# Patient Record
Sex: FEMALE | Race: WHITE | NOT HISPANIC OR LATINO | Employment: OTHER | URBAN - METROPOLITAN AREA
[De-identification: names, ages, dates, MRNs, and addresses within clinical notes are randomized per-mention and may not be internally consistent; named-entity substitution may affect disease eponyms.]

---

## 2017-02-03 ENCOUNTER — OFFICE VISIT - HEALTHEAST (OUTPATIENT)
Dept: INTERNAL MEDICINE | Facility: CLINIC | Age: 72
End: 2017-02-03

## 2017-02-03 DIAGNOSIS — J06.9 URI (UPPER RESPIRATORY INFECTION): ICD-10-CM

## 2017-02-03 ASSESSMENT — MIFFLIN-ST. JEOR: SCORE: 1193.95

## 2017-02-08 ENCOUNTER — COMMUNICATION - HEALTHEAST (OUTPATIENT)
Dept: INTERNAL MEDICINE | Facility: CLINIC | Age: 72
End: 2017-02-08

## 2017-03-01 ENCOUNTER — COMMUNICATION - HEALTHEAST (OUTPATIENT)
Dept: INTERNAL MEDICINE | Facility: CLINIC | Age: 72
End: 2017-03-01

## 2017-04-07 ENCOUNTER — COMMUNICATION - HEALTHEAST (OUTPATIENT)
Dept: INTERNAL MEDICINE | Facility: CLINIC | Age: 72
End: 2017-04-07

## 2017-04-10 ENCOUNTER — COMMUNICATION - HEALTHEAST (OUTPATIENT)
Dept: INTERNAL MEDICINE | Facility: CLINIC | Age: 72
End: 2017-04-10

## 2017-04-14 ENCOUNTER — OFFICE VISIT - HEALTHEAST (OUTPATIENT)
Dept: INTERNAL MEDICINE | Facility: CLINIC | Age: 72
End: 2017-04-14

## 2017-04-14 DIAGNOSIS — R05.9 COUGH: ICD-10-CM

## 2017-04-14 DIAGNOSIS — J30.9 ALLERGIC RHINITIS: ICD-10-CM

## 2017-04-14 ASSESSMENT — MIFFLIN-ST. JEOR: SCORE: 1176.35

## 2017-05-07 ENCOUNTER — COMMUNICATION - HEALTHEAST (OUTPATIENT)
Dept: INTERNAL MEDICINE | Facility: CLINIC | Age: 72
End: 2017-05-07

## 2017-06-27 ENCOUNTER — COMMUNICATION - HEALTHEAST (OUTPATIENT)
Dept: INTERNAL MEDICINE | Facility: CLINIC | Age: 72
End: 2017-06-27

## 2017-08-03 ENCOUNTER — COMMUNICATION - HEALTHEAST (OUTPATIENT)
Dept: INTERNAL MEDICINE | Facility: CLINIC | Age: 72
End: 2017-08-03

## 2017-08-07 ENCOUNTER — OFFICE VISIT - HEALTHEAST (OUTPATIENT)
Dept: INTERNAL MEDICINE | Facility: CLINIC | Age: 72
End: 2017-08-07

## 2017-08-07 DIAGNOSIS — F41.8 SITUATIONAL ANXIETY: ICD-10-CM

## 2017-08-07 DIAGNOSIS — Z00.00 WELLNESS EXAMINATION: ICD-10-CM

## 2017-08-07 LAB
CHOLEST SERPL-MCNC: 253 MG/DL
FASTING STATUS PATIENT QL REPORTED: YES
HDLC SERPL-MCNC: 53 MG/DL
LDLC SERPL CALC-MCNC: 183 MG/DL
TRIGL SERPL-MCNC: 83 MG/DL

## 2017-08-07 ASSESSMENT — MIFFLIN-ST. JEOR: SCORE: 1184.88

## 2017-08-16 ENCOUNTER — AMBULATORY - HEALTHEAST (OUTPATIENT)
Dept: INTERNAL MEDICINE | Facility: CLINIC | Age: 72
End: 2017-08-16

## 2017-08-16 ENCOUNTER — COMMUNICATION - HEALTHEAST (OUTPATIENT)
Dept: INTERNAL MEDICINE | Facility: CLINIC | Age: 72
End: 2017-08-16

## 2017-08-17 ENCOUNTER — AMBULATORY - HEALTHEAST (OUTPATIENT)
Dept: INTERNAL MEDICINE | Facility: CLINIC | Age: 72
End: 2017-08-17

## 2017-08-24 ENCOUNTER — COMMUNICATION - HEALTHEAST (OUTPATIENT)
Dept: NEUROLOGY | Facility: CLINIC | Age: 72
End: 2017-08-24

## 2017-08-29 ENCOUNTER — HOSPITAL ENCOUNTER (OUTPATIENT)
Dept: NEUROLOGY | Facility: CLINIC | Age: 72
Setting detail: THERAPIES SERIES
Discharge: STILL ADMITTED OR ADMINISTRATIVE | End: 2017-08-29
Attending: PSYCHOLOGIST

## 2017-08-29 DIAGNOSIS — F43.23 ADJUSTMENT DISORDER WITH MIXED ANXIETY AND DEPRESSED MOOD: ICD-10-CM

## 2017-09-25 ENCOUNTER — COMMUNICATION - HEALTHEAST (OUTPATIENT)
Dept: HEALTH INFORMATION MANAGEMENT | Facility: CLINIC | Age: 72
End: 2017-09-25

## 2017-10-06 ENCOUNTER — RECORDS - HEALTHEAST (OUTPATIENT)
Dept: ADMINISTRATIVE | Facility: OTHER | Age: 72
End: 2017-10-06

## 2017-10-09 ENCOUNTER — RECORDS - HEALTHEAST (OUTPATIENT)
Dept: ADMINISTRATIVE | Facility: OTHER | Age: 72
End: 2017-10-09

## 2017-10-09 ENCOUNTER — RECORDS - HEALTHEAST (OUTPATIENT)
Dept: BONE DENSITY | Facility: CLINIC | Age: 72
End: 2017-10-09

## 2017-10-09 DIAGNOSIS — Z00.00 ENCOUNTER FOR GENERAL ADULT MEDICAL EXAMINATION WITHOUT ABNORMAL FINDINGS: ICD-10-CM

## 2017-10-18 ENCOUNTER — OFFICE VISIT - HEALTHEAST (OUTPATIENT)
Dept: INTERNAL MEDICINE | Facility: CLINIC | Age: 72
End: 2017-10-18

## 2017-10-18 DIAGNOSIS — R93.1 AGATSTON CORONARY ARTERY CALCIUM SCORE LESS THAN 100: ICD-10-CM

## 2017-10-18 DIAGNOSIS — E78.5 HYPERLIPIDEMIA: ICD-10-CM

## 2017-10-18 DIAGNOSIS — M85.80 OSTEOPENIA: ICD-10-CM

## 2017-10-18 LAB
CHOLEST SERPL-MCNC: 197 MG/DL
FASTING STATUS PATIENT QL REPORTED: YES
HDLC SERPL-MCNC: 63 MG/DL
LDLC SERPL CALC-MCNC: 121 MG/DL
TRIGL SERPL-MCNC: 67 MG/DL

## 2017-10-18 ASSESSMENT — MIFFLIN-ST. JEOR: SCORE: 1194.13

## 2017-11-15 ENCOUNTER — COMMUNICATION - HEALTHEAST (OUTPATIENT)
Dept: INTERNAL MEDICINE | Facility: CLINIC | Age: 72
End: 2017-11-15

## 2017-11-25 ENCOUNTER — COMMUNICATION - HEALTHEAST (OUTPATIENT)
Dept: INTERNAL MEDICINE | Facility: CLINIC | Age: 72
End: 2017-11-25

## 2018-06-01 ENCOUNTER — HOSPITAL ENCOUNTER (OUTPATIENT)
Dept: NEUROLOGY | Facility: CLINIC | Age: 73
Setting detail: THERAPIES SERIES
Discharge: STILL ADMITTED OR ADMINISTRATIVE | End: 2018-06-01
Attending: PSYCHOLOGIST

## 2018-06-01 DIAGNOSIS — F43.23 ADJUSTMENT DISORDER WITH MIXED ANXIETY AND DEPRESSED MOOD: ICD-10-CM

## 2018-06-29 ENCOUNTER — COMMUNICATION - HEALTHEAST (OUTPATIENT)
Dept: INTERNAL MEDICINE | Facility: CLINIC | Age: 73
End: 2018-06-29

## 2018-06-29 DIAGNOSIS — F41.8 SITUATIONAL ANXIETY: ICD-10-CM

## 2018-07-02 ENCOUNTER — HOSPITAL ENCOUNTER (OUTPATIENT)
Dept: NEUROLOGY | Facility: CLINIC | Age: 73
Setting detail: THERAPIES SERIES
Discharge: STILL ADMITTED OR ADMINISTRATIVE | End: 2018-07-02
Attending: PSYCHOLOGIST

## 2018-07-02 DIAGNOSIS — F43.22 ADJUSTMENT DISORDER WITH ANXIETY: ICD-10-CM

## 2018-08-09 ENCOUNTER — COMMUNICATION - HEALTHEAST (OUTPATIENT)
Dept: INTERNAL MEDICINE | Facility: CLINIC | Age: 73
End: 2018-08-09

## 2018-08-09 DIAGNOSIS — E78.5 HYPERLIPIDEMIA LDL GOAL <100: ICD-10-CM

## 2018-10-17 ENCOUNTER — RECORDS - HEALTHEAST (OUTPATIENT)
Dept: ADMINISTRATIVE | Facility: OTHER | Age: 73
End: 2018-10-17

## 2018-10-17 ENCOUNTER — TRANSFERRED RECORDS (OUTPATIENT)
Dept: MULTI SPECIALTY CLINIC | Facility: CLINIC | Age: 73
End: 2018-10-17

## 2018-11-20 ENCOUNTER — COMMUNICATION - HEALTHEAST (OUTPATIENT)
Dept: INTERNAL MEDICINE | Facility: CLINIC | Age: 73
End: 2018-11-20

## 2018-11-20 DIAGNOSIS — E78.5 HYPERLIPIDEMIA LDL GOAL <100: ICD-10-CM

## 2019-02-04 ENCOUNTER — COMMUNICATION - HEALTHEAST (OUTPATIENT)
Dept: INTERNAL MEDICINE | Facility: CLINIC | Age: 74
End: 2019-02-04

## 2019-02-09 ENCOUNTER — COMMUNICATION - HEALTHEAST (OUTPATIENT)
Dept: INTERNAL MEDICINE | Facility: CLINIC | Age: 74
End: 2019-02-09

## 2019-02-09 DIAGNOSIS — E78.5 HYPERLIPIDEMIA LDL GOAL <100: ICD-10-CM

## 2019-05-11 ENCOUNTER — COMMUNICATION - HEALTHEAST (OUTPATIENT)
Dept: INTERNAL MEDICINE | Facility: CLINIC | Age: 74
End: 2019-05-11

## 2019-05-11 DIAGNOSIS — E78.5 HYPERLIPIDEMIA LDL GOAL <100: ICD-10-CM

## 2019-08-27 ENCOUNTER — TELEPHONE (OUTPATIENT)
Dept: FAMILY MEDICINE | Facility: CLINIC | Age: 74
End: 2019-08-27

## 2019-08-27 ENCOUNTER — OFFICE VISIT (OUTPATIENT)
Dept: FAMILY MEDICINE | Facility: CLINIC | Age: 74
End: 2019-08-27
Payer: COMMERCIAL

## 2019-08-27 VITALS
HEART RATE: 68 BPM | OXYGEN SATURATION: 99 % | WEIGHT: 138.4 LBS | BODY MASS INDEX: 20.5 KG/M2 | TEMPERATURE: 97.6 F | DIASTOLIC BLOOD PRESSURE: 63 MMHG | HEIGHT: 69 IN | SYSTOLIC BLOOD PRESSURE: 103 MMHG

## 2019-08-27 DIAGNOSIS — R10.11 ABDOMINAL PAIN, RIGHT UPPER QUADRANT: Primary | ICD-10-CM

## 2019-08-27 DIAGNOSIS — R10.11 ABDOMINAL PAIN, RIGHT UPPER QUADRANT: ICD-10-CM

## 2019-08-27 DIAGNOSIS — R94.4 DECREASED CALCULATED GFR: Primary | ICD-10-CM

## 2019-08-27 LAB
ALBUMIN SERPL-MCNC: 3.5 G/DL (ref 3.4–5)
ALP SERPL-CCNC: 42 U/L (ref 40–150)
ALT SERPL W P-5'-P-CCNC: 20 U/L (ref 0–50)
ANION GAP SERPL CALCULATED.3IONS-SCNC: 3 MMOL/L (ref 3–14)
AST SERPL W P-5'-P-CCNC: 14 U/L (ref 0–45)
BASOPHILS # BLD AUTO: 0 10E9/L (ref 0–0.2)
BASOPHILS NFR BLD AUTO: 0.5 %
BILIRUB SERPL-MCNC: 0.6 MG/DL (ref 0.2–1.3)
BUN SERPL-MCNC: 17 MG/DL (ref 7–30)
CALCIUM SERPL-MCNC: 9.4 MG/DL (ref 8.5–10.1)
CHLORIDE SERPL-SCNC: 106 MMOL/L (ref 94–109)
CO2 SERPL-SCNC: 31 MMOL/L (ref 20–32)
CREAT SERPL-MCNC: 0.96 MG/DL (ref 0.52–1.04)
DIFFERENTIAL METHOD BLD: NORMAL
EOSINOPHIL # BLD AUTO: 0.1 10E9/L (ref 0–0.7)
EOSINOPHIL NFR BLD AUTO: 2.7 %
ERYTHROCYTE [DISTWIDTH] IN BLOOD BY AUTOMATED COUNT: 12.2 % (ref 10–15)
GFR SERPL CREATININE-BSD FRML MDRD: 58 ML/MIN/{1.73_M2}
GLUCOSE SERPL-MCNC: 71 MG/DL (ref 70–99)
HCT VFR BLD AUTO: 44.5 % (ref 35–47)
HGB BLD-MCNC: 14.6 G/DL (ref 11.7–15.7)
IMM GRANULOCYTES # BLD: 0 10E9/L (ref 0–0.4)
IMM GRANULOCYTES NFR BLD: 0.2 %
LYMPHOCYTES # BLD AUTO: 1.1 10E9/L (ref 0.8–5.3)
LYMPHOCYTES NFR BLD AUTO: 26.6 %
MCH RBC QN AUTO: 30.9 PG (ref 26.5–33)
MCHC RBC AUTO-ENTMCNC: 32.8 G/DL (ref 31.5–36.5)
MCV RBC AUTO: 94 FL (ref 78–100)
MONOCYTES # BLD AUTO: 0.4 10E9/L (ref 0–1.3)
MONOCYTES NFR BLD AUTO: 9.2 %
NEUTROPHILS # BLD AUTO: 2.4 10E9/L (ref 1.6–8.3)
NEUTROPHILS NFR BLD AUTO: 60.8 %
NRBC # BLD AUTO: 0 10*3/UL
NRBC BLD AUTO-RTO: 0 /100
PLATELET # BLD AUTO: 222 10E9/L (ref 150–450)
POTASSIUM SERPL-SCNC: 4.2 MMOL/L (ref 3.4–5.3)
PROT SERPL-MCNC: 6.7 G/DL (ref 6.8–8.8)
RBC # BLD AUTO: 4.73 10E12/L (ref 3.8–5.2)
SODIUM SERPL-SCNC: 140 MMOL/L (ref 133–144)
WBC # BLD AUTO: 4 10E9/L (ref 4–11)

## 2019-08-27 ASSESSMENT — MIFFLIN-ST. JEOR: SCORE: 1187.16

## 2019-08-27 ASSESSMENT — PAIN SCALES - GENERAL: PAINLEVEL: NO PAIN (1)

## 2019-08-27 NOTE — TELEPHONE ENCOUNTER
Reviewed lab work with patient on the phone.  Patient's eGFR is slightly reduced, will have patient recheck in the next 2 to 4 weeks.  CHUNGW

## 2019-08-27 NOTE — PATIENT INSTRUCTIONS

## 2019-08-27 NOTE — NURSING NOTE
"74 year old  Chief Complaint   Patient presents with     Pain     Pt has pain under R ribs x2 weeks        Blood pressure 103/63, pulse 68, temperature 97.6  F (36.4  C), height 1.753 m (5' 9\"), weight 62.8 kg (138 lb 6.4 oz), SpO2 99 %. Body mass index is 20.44 kg/m .  BP completed using cuff size:      Destiny Leonard MA  August 27, 2019 8:46 AM  "

## 2019-08-27 NOTE — PROGRESS NOTES
Subjective     Zelda Osman is a 74 year old female who presents to clinic today for the following health issues:  HPI   Patient presents with a 2 weeks history of intermittent RUQ pain.  She describes the pain as a dull ache, rates it 1/10 up to 4/10.  When it is 4/10 it sometimes feels more sharp and it can burn.  She states it can last a few minutes up to 1 hour.  She wonders if it stress related as it started when she brought her  to the ED for diarrhea.  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 Sometimes she notices the pain if she lays more on her side.  She states she does work out at the gym and was recently clearing some brush.  She saw her  who thought it maybe an upper right abdominal strain.  She does feel like exercise maybe makes it better or that she possibly is distracted during exercise and does not feel it.    Declines fever, chills, body aches, unintentional weight loss, no relationship to food, declines constipation or diarrhea.  She declines that she had diarrhea when her  had diarrhea.  Drinks approximately 1 beer per week.   No tobacco use.   No heartburn.  No chest pain or SOB.   No night sweats or intentional weight loss.     ABDOMINAL   PAIN     Onset x 2 weeks  - intermittent - few minutes.    Hour.   Exercise makes it better.      Description:   Character: Dull ache  Location: right upper quadrant  Radiation: None     Intensity: 1/10 to 4/10 (at the worse) - sometimes full sharps and burning.      Progression of Symptoms:  same    Accompanying Signs & Symptoms:  Fever/Chills?: no   Gas/Bloating: no   Nausea: no   Vomitting: no   Diarrhea?: no   Constipation:no   Dysuria or Hematuria: no    History:   Trauma: no   Previous similar pain: no    Previous tests done: none    Precipitating factors:   Does the pain change with:     Food: no      BM: no     Urination: no     Alleviating factors:  -heating pad - feels better.      Therapies Tried and  "outcome: - Tylenol and heating pad.      LMP:  not applicable    Ovarian cyst, ovarian cancer (possibly).         There is no problem list on file for this patient.    Past Surgical History:   Procedure Laterality Date     bowel obstruction due to adhesions       HYSTERECTOMY       OOPHORECTOMY Bilateral        Social History     Tobacco Use     Smoking status: Never Smoker     Smokeless tobacco: Never Used   Substance Use Topics     Alcohol use: Not on file     No family history on file.      No current outpatient medications on file.     No Known Allergies  No lab results found.   BP Readings from Last 3 Encounters:   08/27/19 103/63    Wt Readings from Last 3 Encounters:   08/27/19 62.8 kg (138 lb 6.4 oz)            Reviewed and updated as needed this visit by Provider  Med Hx         Review of Systems   CONSTITUTIONAL: NEGATIVE for fever, chills, change in weight  INTEGUMENTARU/SKIN: NEGATIVE for worrisome rashes, moles or lesions  RESP: NEGATIVE for significant cough or SOB  CV: NEGATIVE for chest pain  GI: NEGATIVE for nausea, heartburn, or change in bowel habits.  POSITIVE for RUQ abdominal pain.   : NEGATIVE for dysuria   PSYCHIATRIC: NEGATIVE for changes in mood or affect    Objective    /63   Pulse 68   Temp 97.6  F (36.4  C)   Ht 1.753 m (5' 9\")   Wt 62.8 kg (138 lb 6.4 oz)   SpO2 99%   BMI 20.44 kg/m    Body mass index is 20.44 kg/m .  Physical Exam   GENERAL: healthy, alert and no distress  RESP: lungs clear to auscultation - no rales, rhonchi or wheezes  CV: regular rate and rhythm, normal S1 S2, no S3 or S4, no murmur, click or rub, no peripheral edema and peripheral pulses strong  ABDOMEN: soft, nontender, no hepatosplenomegaly, no masses and bowel sounds normal  PSYCH: mentation appears normal, affect normal/bright    Diagnostic Test Results:  Labs reviewed in Epic    CBC with diff and CMP are pending    Assessment & Plan     Zelda was seen today for pain.    Diagnoses and all " orders for this visit:    Abdominal pain, right upper quadrant  -     CBC with platelets differential; Future  -     Comprehensive metabolic panel; Future           See Patient Instructions  Patient feels like pain is improving.  She wonders if it is a muscle pull.  Her abdominal examination was benign.  Will obtain lab work today.  Discussed abdominal ultrasound with patient.  Patient would like to hold off on any imaging.  Discussed s/s requiring urgent evaluation - worsening symptoms, fever, chills, body aches. She will try a warm compress or ice.  Return to clinic if no improvement or symptoms worsen. Patient verbalized understanding & agreed with plan of care.    JEWEL Stern, CNP  [facility]

## 2019-08-30 ENCOUNTER — TELEPHONE (OUTPATIENT)
Dept: FAMILY MEDICINE | Facility: CLINIC | Age: 74
End: 2019-08-30

## 2019-08-30 DIAGNOSIS — R10.11 RUQ ABDOMINAL PAIN: Primary | ICD-10-CM

## 2019-08-30 NOTE — TELEPHONE ENCOUNTER
[facility] Call Center    Phone Message    May a detailed message be left on voicemail: yes    Reason for Call: Other: Pt requesting call back. Pain in side has increased and would like to move forward with having Ultrasound done today if possible. Please call.       NOTE: Pt saw Mona Alex on 8/27 for this and it was discussed at that visit.     Action Taken: Message routed to:  [facility] Nurse Practioner [facility]

## 2019-08-30 NOTE — TELEPHONE ENCOUNTER
Please abstract the following data from this visit with this patient into the appropriate field in Epic:    Tests that can be patient reported without a hard copy:      Other Tests found in the patient's chart through Chart Review/Care Everywhere:    Mammogram done by this group [facility] on this date: 10/17/18    Note to Abstraction: If this section is blank, no results were found via Chart Review/Care Everywhere.

## 2019-09-03 ENCOUNTER — TELEPHONE (OUTPATIENT)
Dept: FAMILY MEDICINE | Facility: CLINIC | Age: 74
End: 2019-09-03

## 2019-09-03 ENCOUNTER — ANCILLARY PROCEDURE (OUTPATIENT)
Dept: ULTRASOUND IMAGING | Facility: CLINIC | Age: 74
End: 2019-09-03
Attending: NURSE PRACTITIONER
Payer: COMMERCIAL

## 2019-09-03 ENCOUNTER — DOCUMENTATION ONLY (OUTPATIENT)
Dept: CARE COORDINATION | Facility: CLINIC | Age: 74
End: 2019-09-03

## 2019-09-03 DIAGNOSIS — R94.4 DECREASED CALCULATED GFR: ICD-10-CM

## 2019-09-03 DIAGNOSIS — R10.11 RUQ ABDOMINAL PAIN: ICD-10-CM

## 2019-09-03 DIAGNOSIS — K82.4 GALLBLADDER POLYP: Primary | ICD-10-CM

## 2019-09-03 DIAGNOSIS — R93.5 ABNORMAL ABDOMINAL ULTRASOUND: ICD-10-CM

## 2019-09-03 LAB
ANION GAP SERPL CALCULATED.3IONS-SCNC: <1 MMOL/L (ref 3–14)
BUN SERPL-MCNC: 16 MG/DL (ref 7–30)
CALCIUM SERPL-MCNC: 9.3 MG/DL (ref 8.5–10.1)
CHLORIDE SERPL-SCNC: 107 MMOL/L (ref 94–109)
CO2 SERPL-SCNC: 31 MMOL/L (ref 20–32)
CREAT SERPL-MCNC: 0.9 MG/DL (ref 0.52–1.04)
GFR SERPL CREATININE-BSD FRML MDRD: 62 ML/MIN/{1.73_M2}
GLUCOSE SERPL-MCNC: 99 MG/DL (ref 70–99)
POTASSIUM SERPL-SCNC: 4.3 MMOL/L (ref 3.4–5.3)
SODIUM SERPL-SCNC: 139 MMOL/L (ref 133–144)

## 2019-09-03 NOTE — TELEPHONE ENCOUNTER
Spoke to patient.  Her symptoms are improved. She would like to go ahead with a MRI of her abdomen.  Referral to general surgery placed.   AISHWARYA

## 2019-09-03 NOTE — TELEPHONE ENCOUNTER
"8/30/2019 - late entry - called and spoke to patient. She is having more right upper quadrant pain, but still wonders if it is due to stress.  Declines urinary symptoms or flank pain. Declines fever, chills, or body aches.  Patient is set-up for an abdominal us on Tuesday.  If she has worsening symptoms over the weekend, she will proceed to the ED.    9/3/2019 Left patient a non-detailed message regarding results.  Last Comprehensive Metabolic Panel:  Sodium   Date Value Ref Range Status   09/03/2019 139 133 - 144 mmol/L Final     Potassium   Date Value Ref Range Status   09/03/2019 4.3 3.4 - 5.3 mmol/L Final     Chloride   Date Value Ref Range Status   09/03/2019 107 94 - 109 mmol/L Final     Carbon Dioxide   Date Value Ref Range Status   09/03/2019 31 20 - 32 mmol/L Final     Anion Gap   Date Value Ref Range Status   09/03/2019 <1 (L) 3 - 14 mmol/L Final     Glucose   Date Value Ref Range Status   09/03/2019 99 70 - 99 mg/dL Final     Urea Nitrogen   Date Value Ref Range Status   09/03/2019 16 7 - 30 mg/dL Final     Creatinine   Date Value Ref Range Status   09/03/2019 0.90 0.52 - 1.04 mg/dL Final     GFR Estimate   Date Value Ref Range Status   09/03/2019 62 >60 mL/min/[1.73_m2] Final     Comment:     Non  GFR Calc  Starting 12/18/2018, serum creatinine based estimated GFR (eGFR) will be   calculated using the Chronic Kidney Disease Epidemiology Collaboration   (CKD-EPI) equation.       Calcium   Date Value Ref Range Status   09/03/2019 9.3 8.5 - 10.1 mg/dL Final   Abdominal ultrasound showed -  \"IMPRESSION:   1. No acute right upper quadrant abnormality.  2. Echogenic 6 mm gallbladder wall nodule, likely represents adherent  stone versus polyp.  3. Echogenic 1.1 cm focus in the left kidney, not previously described  on outside report 12/20/2014. Recommend correlation with prior images  as described simply reflect a small angiomyolipoma. If no prior  imaging is available, management options would " "include 6 month  follow-up ultrasound exam versus dedicated MRI abdomen.     I have personally reviewed the examination and initial interpretation  and I agree with the findings.     JOSE GOMEZ MD\"  "

## 2019-09-04 ENCOUNTER — PRE VISIT (OUTPATIENT)
Dept: GASTROENTEROLOGY | Facility: CLINIC | Age: 74
End: 2019-09-04

## 2019-09-04 ASSESSMENT — ENCOUNTER SYMPTOMS
NAUSEA: 0
INCREASED ENERGY: 0
BLOATING: 0
HEARTBURN: 0
DECREASED APPETITE: 0
FEVER: 0
HALLUCINATIONS: 0
ABDOMINAL PAIN: 1
PANIC: 0
VOMITING: 0
DEPRESSION: 0
BOWEL INCONTINENCE: 0
NERVOUS/ANXIOUS: 1
ALTERED TEMPERATURE REGULATION: 0
CHILLS: 0
JAUNDICE: 0
CONSTIPATION: 0
DECREASED CONCENTRATION: 0
POLYDIPSIA: 0
WEIGHT GAIN: 0
BLOOD IN STOOL: 0
WEIGHT LOSS: 0
RECTAL PAIN: 0
POLYPHAGIA: 0
INSOMNIA: 1
NIGHT SWEATS: 0
DIARRHEA: 0
FATIGUE: 0

## 2019-09-08 ENCOUNTER — ANCILLARY PROCEDURE (OUTPATIENT)
Dept: MRI IMAGING | Facility: CLINIC | Age: 74
End: 2019-09-08
Attending: NURSE PRACTITIONER
Payer: COMMERCIAL

## 2019-09-08 DIAGNOSIS — R93.5 ABNORMAL ABDOMINAL ULTRASOUND: ICD-10-CM

## 2019-09-08 LAB — RADIOLOGIST FLAGS: NORMAL

## 2019-09-08 RX ORDER — GADOBUTROL 604.72 MG/ML
7.5 INJECTION INTRAVENOUS ONCE
Status: COMPLETED | OUTPATIENT
Start: 2019-09-08 | End: 2019-09-08

## 2019-09-08 RX ADMIN — GADOBUTROL 7.5 ML: 604.72 INJECTION INTRAVENOUS at 14:52

## 2019-09-08 NOTE — DISCHARGE INSTRUCTIONS
MRI Contrast Discharge Instructions    The IV contrast you received today will pass out of your body in your  urine. This will happen in the next 24 hours. You will not feel this process.  Your urine will not change color.    Drink at least 4 extra glasses of water or juice today (unless your doctor  has restricted your fluids). This reduces the stress on your kidneys.  You may take your regular medicines.    If you are on dialysis: It is best to have dialysis today.    If you have a reaction: Most reactions happen right away. If you have  any new symptoms after leaving the hospital (such as hives or swelling),  call your hospital at the correct number below. Or call your family doctor.  If you have breathing distress or wheezing, call 911.    Special instructions: ***    I have read and understand the above information.    Signature:______________________________________ Date:___________    Staff:__________________________________________ Date:___________     Time:__________    [place] [facility] Departments:    ___[place]: [phone]  ___[facility]: [phone]  ___[place]: [phone] ___[facility]: [phone]  ___[facility]: [phone]  ___[facility]: [phone]  ___[phone]  ___[facility]: [phone]  ___[phone]

## 2019-09-10 ENCOUNTER — TELEPHONE (OUTPATIENT)
Dept: SURGERY | Facility: CLINIC | Age: 74
End: 2019-09-10

## 2019-09-10 ENCOUNTER — TELEPHONE (OUTPATIENT)
Dept: FAMILY MEDICINE | Facility: CLINIC | Age: 74
End: 2019-09-10

## 2019-09-10 DIAGNOSIS — K86.2 CYST OF PANCREAS: Primary | ICD-10-CM

## 2019-09-10 NOTE — TELEPHONE ENCOUNTER
Called Meredith, who wanted to make sure I was aware of the results.   Plan to call patient today with plan of care. AISHWARYA

## 2019-09-10 NOTE — TELEPHONE ENCOUNTER
[facility] Call Center    Phone Message    May a detailed message be left on voicemail: yes    Reason for Call: Other: Meredith calling to report incidental findings on imaging that was done on 9/8/2019.  Please call her back to discuss     Action Taken: Message routed to:  [facility] UC NP

## 2019-09-10 NOTE — TELEPHONE ENCOUNTER
Discussed results with patient.  She declines that her RUQ abdominal pain has worsened.  It has improved and is still intermittent.   Reviewed results with patient.  Will have her be seen by GI to help monitor.  Patient is agreeable to this.  CHUNGW

## 2019-09-10 NOTE — TELEPHONE ENCOUNTER
IMPRESSION: Motion artifact degrades imaging evaluation.  1. 1.1 cm left kidney focus correlating with prior ultrasound 9/3/2019  with MRI characteristics as described above likely containing  hemorrhagic/proteinaceous content. No MRI findings to correlate with  macroscopic or microscopic fat in the present study. Consider further  reassessment in 6 months with MRI as described below.  2. 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  Recommend follow-up with MRI/MRCP in 6 months to evaluate for  stability according to guidelines criteria described below.  3. Additional incidental findings as described above.     [Consider Follow Up: Left kidney and pancreatic lesions]     This report will be copied to the [facility] to ensure a  provider acknowledges the finding.         [facility] recommendations for asymptomatic pancreatic  cysts, modified from international consensus guidelines*   Size of largest cyst:   Less than 1 cm: Follow-up imaging in 6 months to 1 year, then lengthen  interval to 2-3 years if no change.  1-2 cm: Follow-up imaging at 6 months, then yearly for 2 years, then  lengthen interval if no change.   The optimal initial study or first follow-up exam is MRI/MRCP  performed with intravenous gadolinium contrast to allow full cyst  characterization. Once characterized, contrast is optional on  follow-up MRIs. If MRI is contraindicated, CT with contrast is  recommended.   GI consultation for possible endoscopic ultrasound is recommended for  cysts with the following features: Size > 2 cm, >20% growth on  followup, wall thickening or enhancement, mural nodule, duct > 5 mm,  or abrupt change in duct caliber with distal atrophy. GI or surgical  consultation is also recommended for symptomatic cysts.   *Reference: International Consensus Guidelines for Management of IPMN  and MCN of the pancreas.  Pancreatology: 12:(2012); 183-197.     BRUNO PEREZ MD

## 2019-09-10 NOTE — TELEPHONE ENCOUNTER
Pre Visit Call and Assessment    Date of call:  09/10/2019    Phone numbers:  Home number on file [phone] (home)    Reached patient/confirmed appointment:  Yes  Patient care team/Primary provider:  Ruth Malhotra  Referred to:  Dr. Valerie Dumont    Reason for visit:  Gallbladder polyp consult

## 2019-09-11 ENCOUNTER — CARE COORDINATION (OUTPATIENT)
Dept: GASTROENTEROLOGY | Facility: CLINIC | Age: 74
End: 2019-09-11

## 2019-09-11 ENCOUNTER — OFFICE VISIT (OUTPATIENT)
Dept: SURGERY | Facility: CLINIC | Age: 74
End: 2019-09-11
Payer: COMMERCIAL

## 2019-09-11 VITALS
BODY MASS INDEX: 20.17 KG/M2 | HEIGHT: 69 IN | WEIGHT: 136.2 LBS | HEART RATE: 72 BPM | OXYGEN SATURATION: 98 % | DIASTOLIC BLOOD PRESSURE: 65 MMHG | SYSTOLIC BLOOD PRESSURE: 119 MMHG

## 2019-09-11 DIAGNOSIS — K82.4 GALLBLADDER POLYP: Primary | ICD-10-CM

## 2019-09-11 ASSESSMENT — PAIN SCALES - GENERAL: PAINLEVEL: NO PAIN (0)

## 2019-09-11 ASSESSMENT — MIFFLIN-ST. JEOR: SCORE: 1177.18

## 2019-09-11 NOTE — PROGRESS NOTES
Incidental 6 mm cyst in pancreas in 75 yo pt.    Likely recommendation will be for repeat MRI in 3 years. May consider EUS if continuing to have the RUQ pain that began this testing. Appears will likely have repeat MRI in 6 months for renal indication.    Recommend elective outpt clinic consult.    ALYSIA Maradiaga MD  Associate Professor of Medicine  Division of Gastroenterology, Hepatology and Nutrition  [facility]

## 2019-09-11 NOTE — PROGRESS NOTES
Care Coordination New Patient Referral  [facility]    NP referral date: 09/11/19  Referred to MEERA Maradiaga    Referring MD: Mona Johnson, APRN CNP    No referral information listed in Epic    Referral for evaluation    Diagnosis: pancreas cyst    Imaging:   CT none  MRI  9/8/19  Location:  [facility]  Date:  09/08/19  IMPRESSION: Motion artifact degrades imaging evaluation.  1. 1.1 cm left kidney focus correlating with prior ultrasound 9/3/2019  with MRI characteristics as described above likely containing  hemorrhagic/proteinaceous content. No MRI findings to correlate with  macroscopic or microscopic fat in the present study. Consider further  reassessment in 6 months with MRI as described below.  2. 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  Recommend follow-up with MRI/MRCP in 6 months to evaluate for  stability according to guidelines criteria described below.  3. Additional incidental findings as described above.    Referral  reviewed by Dr. Maradiaga    Referral sent to New Patient scheduling on 09/11/19 at 11 am via in basket staff message and the patient will be contacted with appointment.       Rhonda Santillan  BSN, HNBC  RN Care Coordinator  [facility]  Ph: [phone]  Email: [email or web address]

## 2019-09-11 NOTE — LETTER
"9/11/2019       RE: Zelda Osman  [address] [id number]     Dear Colleague,    Thank you for referring your patient, Zelda Osman, to the [facility] SUR[facility] at [facility]. Please see a copy of my visit note below.    Surgery Clinic Note    Reason for visit:  Gallbladder polyp    HPI:  Zelda Osman is a 74 year old female with no significant medical problems who was referred for surgical consultation after a RUQ US demonstrated a 3x6x3 cm polyp.  She states that during the month of August she was under a period of \"high stress\" and began noticing intermitted right upper quadrant pain.  She describes this as a pulling sensation that would come and go, lasted up to a half a day, improved with working out, not associated with nausea, vomiting, changes in appetite or bowel movements.  She underwent imaging with the above findings, and was surprised to learn not only of the gallbladder polyp, but of a cyst in her kidney and another in her spleen.     She states that in the last week or two the pain has completely resolved.      /65 (BP Location: Left arm, Patient Position: Chair, Cuff Size: Adult Regular)   Pulse 72   Ht 1.753 m (5' 9\")   Wt 61.8 kg (136 lb 3.2 oz)   SpO2 98%   BMI 20.11 kg/m     Healthy well appearing female  NAD  RRR  CTAB  Abd soft, non distended, non tender, well healed lower midline incision, negative manjarrez's signs  Ext non edematous    PMHx:    ? Ovarian CA    PSHx:  TAHBSO    Medications:    None    Allergies:  Dulera    Social history:  No smoking, 1-2 units of ETOH per week.     Assessment and plan:  74F with recent history of RUQ/ right flank pain.  Per patient description, likely MSK in origin. However, imaging showed small gallbladder polyp (3x6mm, no stones)    I believe the nodular lesion is unlikely the etiology of the patient's pain several weeks ago, and is also very unlikely to represent a " malignancy.     Given resolution of symptoms and the low likelihood of this being a cancerous lesion, I have recommended against surgery for this patient.      She will follow up in 6-12 months for reevaluation of her pancreas cyst, and gallbladder will be reevaluated at this time as well.    F/U JO-ANN Dumont MD  09/11/19  10:12 AM

## 2019-09-11 NOTE — NURSING NOTE
"Chief Complaint   Patient presents with     Consult     pt here for consult on gallbladder polyp       Vitals:    09/11/19 0929   BP: 119/65   BP Location: Left arm   Patient Position: Chair   Cuff Size: Adult Regular   Pulse: 72   SpO2: 98%   Weight: 61.8 kg (136 lb 3.2 oz)   Height: 1.753 m (5' 9\")       Body mass index is 20.11 kg/m .    Ernesto Best, EMT    "

## 2019-09-11 NOTE — PROGRESS NOTES
"Surgery Clinic Note    Reason for visit:  Gallbladder polyp    HPI:  Zelda Osman is a 74 year old female with no significant medical problems who was referred for surgical consultation after a RUQ US demonstrated a 3x6x3 cm polyp.  She states that during the month of August she was under a period of \"high stress\" and began noticing intermitted right upper quadrant pain.  She describes this as a pulling sensation that would come and go, lasted up to a half a day, improved with working out, not associated with nausea, vomiting, changes in appetite or bowel movements.  She underwent imaging with the above findings, and was surprised to learn not only of the gallbladder polyp, but of a cyst in her kidney and another in her spleen.     She states that in the last week or two the pain has completely resolved.        /65 (BP Location: Left arm, Patient Position: Chair, Cuff Size: Adult Regular)   Pulse 72   Ht 1.753 m (5' 9\")   Wt 61.8 kg (136 lb 3.2 oz)   SpO2 98%   BMI 20.11 kg/m    Healthy well appearing female  NAD  RRR  CTAB  Abd soft, non distended, non tender, well healed lower midline incision, negative manjarrez's signs  Ext non edematous    PMHx:    ? Ovarian CA    PSHx:  TAHBSO    Medications:    None    Allergies:  Dulera    Social history:  No smoking, 1-2 units of ETOH per week.      Assessment and plan:  74F with recent history of RUQ/ right flank pain.  Per patient description, likely MSK in origin. However, imaging showed small gallbladder polyp (3x6mm, no stones)    I believe the nodular lesion is unlikely the etiology of the patient's pain several weeks ago, and is also very unlikely to represent a malignancy.     Given resolution of symptoms and the low likelihood of this being a cancerous lesion, I have recommended against surgery for this patient.      She will follow up in 6-12 months for reevaluation of her pancreas cyst, and gallbladder will be reevaluated at this time as well.    F/U " JO-ANN Dumont MD  09/11/19  10:12 AM      Answers for HPI/ROS submitted by the patient on 9/4/2019   General Symptoms: Yes  Skin Symptoms: No  HENT Symptoms: No  EYE SYMPTOMS: No  HEART SYMPTOMS: No  LUNG SYMPTOMS: No  INTESTINAL SYMPTOMS: Yes  URINARY SYMPTOMS: No  GYNECOLOGIC SYMPTOMS: No  BREAST SYMPTOMS: No  SKELETAL SYMPTOMS: No  BLOOD SYMPTOMS: No  NERVOUS SYSTEM SYMPTOMS: No  MENTAL HEALTH SYMPTOMS: Yes  Fever: No  Loss of appetite: No  Weight loss: No  Weight gain: No  Fatigue: No  Night sweats: No  Chills: No  Increased stress: Yes  Excessive hunger: No  Excessive thirst: No  Feeling hot or cold when others believe the temperature is normal: No  Loss of height: No  Post-operative complications: No  Surgical site pain: No  Hallucinations: No  Change in or Loss of Energy: No  Hyperactivity: No  Confusion: No  Heart burn or indigestion: No  Nausea: No  Vomiting: No  Abdominal pain: Yes  Bloating: No  Constipation: No  Diarrhea: No  Blood in stool: No  Black stools: No  Rectal or Anal pain: No  Fecal incontinence: No  Yellowing of skin or eyes: No  Vomit with blood: No  Change in stools: No  Nervous or Anxious: Yes  Depression: No  Trouble sleeping: Yes  Trouble thinking or concentrating: No  Mood changes: No  Panic attacks: No

## 2019-09-11 NOTE — PATIENT INSTRUCTIONS
You met with Dr. Valerie Dumont.      Today's visit instructions:    Return to the Surgery Clinic on an as needed basis.        If you have questions please contact David RN or Klarissa RN during regular clinic hours, Monday through Friday 7:30 AM - 4:00 PM, or you can contact us via [employer] at anytime.       If you have urgent needs after-hours, weekends, or holidays please call the hospital at [phone] and ask to speak with our on-call General Surgery Team.    Appointment [phone], option #1   Nurse Advice (David or Klarissa): [phone]   Surgery Scheduler ([place]): [phone]  Fax: [phone]

## 2019-09-12 NOTE — TELEPHONE ENCOUNTER
ONCOLOGY INTAKE: Records Information      APPT INFORMATION: 11/4/19 - Ashwini Jason [facility]  Referring provider:  Valerie Alves  Referring provider s clinic:  [facility]  Reason for visit/diagnosis:  pancreas cyst  Has patient been notified of appointment date and time?: Yes    RECORDS INFORMATION:  Were the records received with the referral (via [employer])? Internal referral    Has patient been seen for any external appt for this diagnosis? No    If yes, where? NA    Has patient had any imaging or procedures outside of [place] for this condition? No      If Yes, where? NA    ADDITIONAL INFORMATION:  Scheduled via [employer] from Rhonda / call to pt to confirm

## 2019-10-04 ENCOUNTER — HEALTH MAINTENANCE LETTER (OUTPATIENT)
Age: 74
End: 2019-10-04

## 2019-11-01 ENCOUNTER — RECORDS - HEALTHEAST (OUTPATIENT)
Dept: ADMINISTRATIVE | Facility: OTHER | Age: 74
End: 2019-11-01

## 2019-11-04 ENCOUNTER — OFFICE VISIT (OUTPATIENT)
Dept: GASTROENTEROLOGY | Facility: CLINIC | Age: 74
End: 2019-11-04
Attending: NURSE PRACTITIONER
Payer: COMMERCIAL

## 2019-11-04 ENCOUNTER — TELEPHONE (OUTPATIENT)
Dept: GASTROENTEROLOGY | Facility: CLINIC | Age: 74
End: 2019-11-04

## 2019-11-04 VITALS
HEIGHT: 69 IN | DIASTOLIC BLOOD PRESSURE: 69 MMHG | SYSTOLIC BLOOD PRESSURE: 123 MMHG | BODY MASS INDEX: 19.99 KG/M2 | RESPIRATION RATE: 16 BRPM | TEMPERATURE: 98 F | OXYGEN SATURATION: 98 % | WEIGHT: 135 LBS | HEART RATE: 75 BPM

## 2019-11-04 DIAGNOSIS — K82.4 GALLBLADDER POLYP: ICD-10-CM

## 2019-11-04 DIAGNOSIS — N28.9 RENAL LESION: Primary | ICD-10-CM

## 2019-11-04 DIAGNOSIS — K86.2 PANCREAS CYST: ICD-10-CM

## 2019-11-04 DIAGNOSIS — R10.9 ABDOMINAL PAIN: Primary | ICD-10-CM

## 2019-11-04 PROCEDURE — 40000114 ZZH STATISTIC NO CHARGE CLINIC VISIT

## 2019-11-04 ASSESSMENT — PAIN SCALES - GENERAL: PAINLEVEL: NO PAIN (0)

## 2019-11-04 ASSESSMENT — MIFFLIN-ST. JEOR: SCORE: 1166.99

## 2019-11-04 NOTE — TELEPHONE ENCOUNTER
Ms. Johnson:    I saw Zelda in clinic today re the incidentally identified pancreatic cysts.    Dictated note will follow.    With regards to the cysts, there were no worrisome findings. Published guidelines recommend repeat MRI in 3 yrs when all cysts are less than 1 cm. I typically stop surveillance after age 75, but given that these have not previously been followed, I recommended a repeat MRI in 3 years. My office will contact here to coordinate this and I'll see her at that time.    The gallbladder polyp will need follow-up with US in 6 months given it was not seen on the MRI. My office will coordinate this US as well. If stable, I would be inclined to stop follow-up.    This leaves the renal lesion. I will defer to you regarding whether you would like to coordinate follow-up or obtain formal consultation with urology. It may warrant MRI in 6 months, however again this would not adequately visualize the gallbladder and is otherwise not indicated from a GI standpoint.    ALYSIA Maradiaga MD  Associate Professor of Medicine  Division of Gastroenterology, Hepatology and Nutrition  [facility]

## 2019-11-04 NOTE — PROGRESS NOTES
PRESENTING COMPLAINT:  The patient is a 74-year-old white female who I was asked to see in consultation at the request of Mona Johnson for evaluation of incidentally identified cyst in the pancreas.      HISTORY OF PRESENT ILLNESS:  The patient was seen in Primary Care Clinic on 08/27/2019 with complaints of right upper quadrant abdominal pain that had been present for approximately 2 weeks.  There was some thought that this may have been related to stress and muscular strain related to exercise.  The pain subsequently persisted, however, and this led to a right upper quadrant ultrasound, which was performed on 09/03.  Of note, she had normal liver function tests in August in addition to a normal complete blood count.  The ultrasound on 09/03 incidentally identified a 6 mm echogenic focus in the gallbladder wall which is thought likely related to a polyp but potentially related to a stone.  In addition, there was an echogenic 1.1 cm focus in the left kidney which was not previously mentioned in 2014, and thus, MRI was recommended to further evaluate the kidney.  This MRI was performed on 09/08/2019.  The MRI did not identify the gallbladder lesion and mentioned the kidney lesion.  It is somewhat unclear to me from the report what the thought was regarding etiology.  However, it sounds as though there may have been concerns regarding this being a hemorrhagic proteinaceous cyst.  This was not thought to be consistent with a myelolipoma.  A recommendation was made for possible 6-month followup with MRI.  Incidentally, however, on this MRI, there was mention of a few cysts in the pancreas measuring up to 6 mm in diameter as well as some fatty replacement but preserved T1 signal.      The patient states that she has never previously been aware of any concerns regarding her pancreas.  She has no history of pancreatitis.  Her right upper quadrant complaints resolved within a week or so of her clinic visit and have not  recurred.  She has no history of jaundice.  Her weight has been stable within 10 pounds.  Her bowel movement are normal and she has no jaundice or scleral icterus.      She was recently seen in the Surgical Clinic regarding the gallbladder lesion and a cholecystectomy was not recommended.      PAST MEDICAL HISTORY:  Ovarian cyst, which sounds as though it may have been a mucinous cystic neoplasm in her 20s.  Otherwise, she has the left renal lesion and the pancreatic cysts as discussed above.      PAST SURGICAL HISTORY:  Prior oophorectomy and hysterectomy in her 20s and prior small-bowel obstruction related to adhesions, unclear date of that surgery.      ALLERGIES/MEDICATIONS:  Reviewed and documented in Epic.      FAMILY HISTORY:  Negative for pancreatic disease.  She is currently caring for her  who has dementia and accompanied her today.      SOCIAL HISTORY:  There is no history of tobacco use.  She drinks alcohol rarely and in moderation.      REVIEW OF SYSTEMS:  See History of Present Illness and patient self-reported inventory attached to this note.      PHYSICAL EXAMINATION:   VITAL SIGNS:  Reported in Epic.   GENERAL:  The patient is awake, alert, in no acute distress.   HEENT:  Shows no scleral icterus.   LUNGS:  Clear.   CARDIAC:  Regular rate and rhythm without murmur, gallop or rub.   ABDOMEN:  Normal bowel sounds.  There is no focal mass, tenderness or hepatosplenomegaly.  There is no rebound, guarding or rigidity.   EXTREMITIES:  No clubbing, cyanosis or edema.   SKIN:  Shows no jaundice.   PSYCHIATRIC:  Her affect is bright and appropriate.      ASSESSMENT AND PLAN:   1.  Pancreatic cyst.  She has a few small incidentally identified cysts in the pancreas measuring up to 6 mm in diameter.  There is no dilation of the main pancreatic duct.  None of the cysts have enhancing solid components.  She is asymptomatic and does not have any worrisome or high-risk features.  We discussed the  differential diagnosis for incidentally identified cysts.  This is statistically most likely to represent branch duct variant intraductal papillary mucinous neoplasm.  We discussed that this is analogous to having adenomatous polyps in the colon as there is some risk for malignant progression; however, this is statistically rare.  We also discussed that up to 40% of patients over age 70 will have the cysts identified incidentally on imaging and this is much more common than we used to believe and fairly low risk.  We discussed published consensus guidelines for management.  I also discussed that I typically stop surveillance imaging at age 75, which is similar to when we stop surveillance for colon polyps.  However, given that we have not previously been following these cysts, I think it would be reasonable to followup for a short period of time to ensure that they are not changing.  Published consensus guidelines for management of cysts less than 10 mm would suggest follow up with an MRI with contrast in 3 years.  I will coordinate that MRI and see the patient at that time to review the imaging.  She was instructed to contact me for earlier evaluation if she develops unexplained weight loss of more than 15-20 pounds, jaundice, chronic diarrhea lasting more than 2 weeks or is diagnosed with acute pancreatitis.   2.  Gallbladder polyp.  This gallbladder polyp measures 6 mm in diameter.  It is not causing symptoms and she does not have stones in the gallbladder.  Published guidelines for surveillance would suggest a right upper quadrant ultrasound in 6 months.  I think this makes the most sense, especially given the lesion was not seen on the recent MRI.  We will arrange for a right upper quadrant ultrasound in 6 months and discuss whether to pursue further surveillance at that time based on the imaging.  I do not know that I need to see her in clinic at that time but will coordinate followup when the results return.    3.  Renal cyst.  I suspect that this was concern for a renal cyst.  Again, it is unclear to me if there has been specific followup or further evaluation recommended for this.  I am not personally qualified to determine further surveillance of this.  Again, I do not believe the pancreas findings warrant MRI.  I will discuss this with her primary care provider regarding whether they are pursuing further Nephrology or Urology evaluation or plans for followup MRI.  It is possible that MRI will be performed in 6 months depending the plans of primary care.     Answers for HPI/ROS submitted by the patient on 11/2/2019   General Symptoms: No  Skin Symptoms: No  HENT Symptoms: No  EYE SYMPTOMS: No  HEART SYMPTOMS: No  LUNG SYMPTOMS: No  INTESTINAL SYMPTOMS: No  URINARY SYMPTOMS: No  GYNECOLOGIC SYMPTOMS: No  BREAST SYMPTOMS: No  SKELETAL SYMPTOMS: No  BLOOD SYMPTOMS: No  NERVOUS SYSTEM SYMPTOMS: No  MENTAL HEALTH SYMPTOMS: No

## 2019-11-04 NOTE — LETTER
11/4/2019       RE: Zelda Osman  [address] [address]     Dear Colleague,    Thank you for referring your patient, Zelda Osman, to the [facility] at [facility]. Please see a copy of my visit note below.    PRESENTING COMPLAINT:  The patient is a 74-year-old white female who I was asked to see in consultation at the request of Mona Johnson for evaluation of incidentally identified cyst in the pancreas.      HISTORY OF PRESENT ILLNESS:  The patient was seen in Primary Care Clinic on 08/27/2019 with complaints of right upper quadrant abdominal pain that had been present for approximately 2 weeks.  There was some thought that this may have been related to stress and muscular strain related to exercise.  The pain subsequently persisted, however, and this led to a right upper quadrant ultrasound, which was performed on 09/03.  Of note, she had normal liver function tests in August in addition to a normal complete blood count.  The ultrasound on 09/03 incidentally identified a 6 mm echogenic focus in the gallbladder wall which is thought likely related to a polyp but potentially related to a stone.  In addition, there was an echogenic 1.1 cm focus in the left kidney which was not previously mentioned in 2014, and thus, MRI was recommended to further evaluate the kidney.  This MRI was performed on 09/08/2019.  The MRI did not identify the gallbladder lesion and mentioned the kidney lesion.  It is somewhat unclear to me from the report what the thought was regarding etiology.  However, it sounds as though there may have been concerns regarding this being a hemorrhagic proteinaceous cyst.  This was not thought to be consistent with a myelolipoma.  A recommendation was made for possible 6-month followup with MRI.  Incidentally, however, on this MRI, there was mention of a few cysts in the pancreas measuring up to 6 mm in diameter as  well as some fatty replacement but preserved T1 signal.      The patient states that she has never previously been aware of any concerns regarding her pancreas.  She has no history of pancreatitis.  Her right upper quadrant complaints resolved within a week or so of her clinic visit and have not recurred.  She has no history of jaundice.  Her weight has been stable within 10 pounds.  Her bowel movement are normal and she has no jaundice or scleral icterus.      She was recently seen in the Surgical Clinic regarding the gallbladder lesion and a cholecystectomy was not recommended.      PAST MEDICAL HISTORY:  Ovarian cyst, which sounds as though it may have been a mucinous cystic neoplasm in her 20s.  Otherwise, she has the left renal lesion and the pancreatic cysts as discussed above.      PAST SURGICAL HISTORY:  Prior oophorectomy and hysterectomy in her 20s and prior small-bowel obstruction related to adhesions, unclear date of that surgery.      ALLERGIES/MEDICATIONS:  Reviewed and documented in [facility].      FAMILY HISTORY:  Negative for pancreatic disease.  She is currently caring for her  who has dementia and accompanied her today.      SOCIAL HISTORY:  There is no history of tobacco use.  She drinks alcohol rarely and in moderation.      REVIEW OF SYSTEMS:  See History of Present Illness and patient self-reported inventory attached to this note.      PHYSICAL EXAMINATION:   VITAL SIGNS:  Reported in Epic.   GENERAL:  The patient is awake, alert, in no acute distress.   HEENT:  Shows no scleral icterus.   LUNGS:  Clear.   CARDIAC:  Regular rate and rhythm without murmur, gallop or rub.   ABDOMEN:  Normal bowel sounds.  There is no focal mass, tenderness or hepatosplenomegaly.  There is no rebound, guarding or rigidity.   EXTREMITIES:  No clubbing, cyanosis or edema.   SKIN:  Shows no jaundice.   PSYCHIATRIC:  Her affect is bright and appropriate.      ASSESSMENT AND PLAN:   1.  Pancreatic cyst.  She has a  few small incidentally identified cysts in the pancreas measuring up to 6 mm in diameter.  There is no dilation of the main pancreatic duct.  None of the cysts have enhancing solid components.  She is asymptomatic and does not have any worrisome or high-risk features.  We discussed the differential diagnosis for incidentally identified cysts.  This is statistically most likely to represent branch duct variant intraductal papillary mucinous neoplasm.  We discussed that this is analogous to having adenomatous polyps in the colon as there is some risk for malignant progression; however, this is statistically rare.  We also discussed that up to 40% of patients over age 70 will have the cysts identified incidentally on imaging and this is much more common than we used to believe and fairly low risk.  We discussed published consensus guidelines for management.  I also discussed that I typically stop surveillance imaging at age 74, which is similar to when we stop surveillance for colon polyps.  However, given that we have not previously been following these cysts, I think it would be reasonable to followup for a short period of time to ensure that they are not changing.  Published consensus guidelines for management of cysts less than 10 mm would suggest follow up with an MRI with contrast in 3 years.  I will coordinate that MRI and see the patient at that time to review the imaging.  She was instructed to contact me for earlier evaluation if she develops unexplained weight loss of more than 15-20 pounds, jaundice, chronic diarrhea lasting more than 2 weeks or is diagnosed with acute pancreatitis.   2.  Gallbladder polyp.  This gallbladder polyp measures 6 mm in diameter.  It is not causing symptoms and she does not have stones in the gallbladder.  Published guidelines for surveillance would suggest a right upper quadrant ultrasound in 6 months.  I think this makes the most sense, especially given the lesion was not seen  on the recent MRI.  We will arrange for a right upper quadrant ultrasound in 6 months and discuss whether to pursue further surveillance at that time based on the imaging.  I do not know that I need to see her in clinic at that time but will coordinate followup when the results return.   3.  Renal cyst.  I suspect that this was concern for a renal cyst.  Again, it is unclear to me if there has been specific followup or further evaluation recommended for this.  I am not personally qualified to determine further surveillance of this.  Again, I do not believe the pancreas findings warrant MRI.  I will discuss this with her primary care provider regarding whether they are pursuing further Nephrology or Urology evaluation or plans for followup MRI.  It is possible that MRI will be performed in 6 months depending the plans of primary care.     Again, thank you for allowing me to participate in the care of your patient.      Sincerely,    Tunde Maradiaga MD

## 2019-11-05 NOTE — TELEPHONE ENCOUNTER
Called patient. Reviewed recommends below with patient.  Patient prefers to do the US to follow-up on gallbladder polyp and the MRI to follow-up on the renal lesion.   Prefers not to consult with urology at this time.  Patient will call when she wants to set these up.   AISHWARYA

## 2019-11-23 ASSESSMENT — ACTIVITIES OF DAILY LIVING (ADL)
IN_THE_PAST_7_DAYS,_DID_YOU_NEED_HELP_FROM_OTHERS_TO_TAKE_CARE_OF_THINGS_SUCH_AS_LAUNDRY_AND_HOUSEKEEPING,_BANKING,_SHOPPING,_USING_THE_TELEPHONE,_FOOD_PREPARATION,_TRANSPORTATION,_OR_TAKING_YOUR_OWN_MEDICATIONS?: N

## 2019-12-02 ENCOUNTER — OFFICE VISIT (OUTPATIENT)
Dept: INTERNAL MEDICINE | Facility: CLINIC | Age: 74
End: 2019-12-02
Payer: COMMERCIAL

## 2019-12-02 ENCOUNTER — TELEPHONE (OUTPATIENT)
Dept: GASTROENTEROLOGY | Facility: CLINIC | Age: 74
End: 2019-12-02

## 2019-12-02 VITALS
BODY MASS INDEX: 20.56 KG/M2 | OXYGEN SATURATION: 98 % | HEART RATE: 75 BPM | SYSTOLIC BLOOD PRESSURE: 108 MMHG | WEIGHT: 138 LBS | DIASTOLIC BLOOD PRESSURE: 67 MMHG

## 2019-12-02 DIAGNOSIS — E78.5 HYPERLIPIDEMIA LDL GOAL <100: ICD-10-CM

## 2019-12-02 DIAGNOSIS — Z00.00 ROUTINE HEALTH MAINTENANCE: Primary | ICD-10-CM

## 2019-12-02 DIAGNOSIS — M85.89 OSTEOPENIA OF MULTIPLE SITES: ICD-10-CM

## 2019-12-02 RX ORDER — ATORVASTATIN CALCIUM 20 MG/1
20 TABLET, FILM COATED ORAL DAILY
Qty: 100 TABLET | Refills: 3 | Status: SHIPPED | OUTPATIENT
Start: 2019-12-02 | End: 2020-12-04

## 2019-12-02 SDOH — HEALTH STABILITY: MENTAL HEALTH: HOW MANY STANDARD DRINKS CONTAINING ALCOHOL DO YOU HAVE ON A TYPICAL DAY?: 1 OR 2

## 2019-12-02 ASSESSMENT — PAIN SCALES - GENERAL: PAINLEVEL: NO PAIN (0)

## 2019-12-02 NOTE — NURSING NOTE
Chief Complaint   Patient presents with     Establish Care     pt here ro establish care       Jaimie Torres CMA, EMT at 8:48 AM on 12/2/2019.

## 2019-12-02 NOTE — PATIENT INSTRUCTIONS
[facility] Medication Refill Request Information:  * Please contact your pharmacy regarding ANY request for medication refills.  ** [facility] Prescription Fax = [phone]  * Please allow 3 business days for routine medication refills.  * Please allow 5 business days for controlled substance medication refills.     [facility] Test Result notification information:  *You will be notified with in 7-10 days of your appointment day regarding the results of your test.  If you are on MyChart you will be notified as soon as the provider has reviewed the results and signed off on them.    [facility]: [phone]

## 2019-12-02 NOTE — PROGRESS NOTES
HPI  74-year-old presents today with her  Angelo who has Alzheimer's disease to establish primary care.  She has followed by number physicians over the years.  Is been found to have the osteopenic and is been treated topically with hormone concoction for this.  She also presented recently with some right upper quadrant pain which she felt was related to muscular strain.  She underwent a series of imaging studies which showed a benign gallbladder polyp as well as a small pancreatic cyst.  Recommended that she get follow-up imaging on this and is been scheduled for next year.  She is been seen in consultation by surgery and GI who do not feel that any other intervention is indicated at the present time.  She brought in a series of cholesterol values to review.  She previously had run LDLs as high as 183 cholesterol in the 270s.  She was started on statin with significant improvement but is subsequently gone off the statin having red about taking it at her age.  We discussed this in some detail.  Otherwise she is healthy with a focus on fruits and vegetables she exercises regularly doing a combination of strength training and cardiovascular training.  She tolerates this well without symptoms or problems.  Presently not having any pain or discomfort.  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     Past Surgical History:   Procedure Laterality Date     bowel obstruction due to adhesions       HYSTERECTOMY       OOPHORECTOMY Bilateral      Family History   Problem Relation Age of  Onset     Cardiomyopathy Father         viral  age 85     Social History     Socioeconomic History     Marital status:      Spouse name: None     Number of children: None     Years of education: None     Highest education level: None   Occupational History     None   Social Needs     Financial resource strain: None     Food insecurity:     Worry: None     Inability: None     Transportation needs:     Medical: None     Non-medical: None   Tobacco Use     Smoking status: Never Smoker     Smokeless tobacco: Never Used   Substance and Sexual Activity     Alcohol use: None     Drug use: None     Sexual activity: None   Lifestyle     Physical activity:     Days per week: None     Minutes per session: None     Stress: None   Relationships     Social connections:     Talks on phone: None     Gets together: None     Attends Synagogue service: None     Active member of club or organization: None     Attends meetings of clubs or organizations: None     Relationship status: None     Intimate partner violence:     Fear of current or ex partner: None     Emotionally abused: None     Physically abused: None     Forced sexual activity: None   Other Topics Concern     None   Social History Narrative     None     Answers for HPI/ROS submitted by the patient on 2019   General Symptoms: No  Skin Symptoms: No  HENT Symptoms: No  EYE SYMPTOMS: No  HEART SYMPTOMS: No  LUNG SYMPTOMS: No  INTESTINAL SYMPTOMS: No  URINARY SYMPTOMS: No  GYNECOLOGIC SYMPTOMS: No  BREAST SYMPTOMS: No  SKELETAL SYMPTOMS: No  BLOOD SYMPTOMS: No  NERVOUS SYSTEM SYMPTOMS: No  MENTAL HEALTH SYMPTOMS: No    Exam:  /67 (BP Location: Right arm, Patient Position: Sitting, Cuff Size: Adult Regular)   Pulse 75   Wt 62.6 kg (138 lb)   SpO2 98%   BMI 20.56 kg/m    138 lbs 0 oz  PHYSICAL EXAMINATION:   The patient is alert, oriented with a clear sensorium.   Skin shows no lesions or rashes and good turgor.   Head is normocephalic and atraumatic.    Eyes show PERRLA. Fundi are benign.   Ears show normal TMs bilaterally.   Mouth shows clear oral mucosa.   Neck shows no nodes, thyromegaly or bruits.   Back is nontender.   Lungs are clear to percussion and auscultation.   Heart shows normal S1 and S2 without murmur or gallop.   Abdomen is soft, nontender without masses or organomegaly.   Extremities show no edema and no evidence of active synovitis.   Neurologic examination shows cranial nerves II-XII intact. Motor shows normal strength. Reflexes are full and symmetrical.     ASSESSMENT  1 hyperlipidemia recommend statin  2 osteopenia on supplemental calcium and vitamin D  3 pancreatic cysts needs follow-up next year  4 benign gallbladder polyp    Plan  In a recheck her DEXA scan schedule her for fasting labs start her on atorvastatin 20 mg daily however plan for reassessment of the lipids in another 6 months.  She is current on her immunizations and colonoscopy.    This note was completed using Dragon voice recognition software.  Although reviewed after completion, some word and grammatical errors may occur.    Tunde Painter MD  General Internal Medicine  [facility]  [phone]

## 2019-12-03 ENCOUNTER — TELEPHONE (OUTPATIENT)
Dept: GASTROENTEROLOGY | Facility: CLINIC | Age: 74
End: 2019-12-03

## 2019-12-11 ENCOUNTER — ANCILLARY PROCEDURE (OUTPATIENT)
Dept: BONE DENSITY | Facility: CLINIC | Age: 74
End: 2019-12-11
Attending: INTERNAL MEDICINE
Payer: COMMERCIAL

## 2019-12-11 DIAGNOSIS — M85.89 OSTEOPENIA OF MULTIPLE SITES: ICD-10-CM

## 2019-12-11 DIAGNOSIS — E78.5 HYPERLIPIDEMIA LDL GOAL <100: ICD-10-CM

## 2019-12-11 LAB
CHOLEST SERPL-MCNC: 242 MG/DL
HDLC SERPL-MCNC: 74 MG/DL
LDLC SERPL CALC-MCNC: 153 MG/DL
NONHDLC SERPL-MCNC: 167 MG/DL
T4 FREE SERPL-MCNC: 1.06 NG/DL (ref 0.76–1.46)
TRIGL SERPL-MCNC: 72 MG/DL
TSH SERPL DL<=0.005 MIU/L-ACNC: 4.22 MU/L (ref 0.4–4)

## 2020-02-25 ENCOUNTER — HOSPITAL ENCOUNTER (OUTPATIENT)
Dept: NEUROLOGY | Facility: CLINIC | Age: 75
Setting detail: THERAPIES SERIES
Discharge: STILL ADMITTED OR ADMINISTRATIVE | End: 2020-02-25
Attending: PSYCHOLOGIST

## 2020-02-25 DIAGNOSIS — F43.23 ADJUSTMENT DISORDER WITH MIXED ANXIETY AND DEPRESSED MOOD: ICD-10-CM

## 2020-04-15 ENCOUNTER — CARE COORDINATION (OUTPATIENT)
Dept: GASTROENTEROLOGY | Facility: CLINIC | Age: 75
End: 2020-04-15

## 2020-04-15 NOTE — PROGRESS NOTES
Care Coordination   [facility]     Dr. Maradiaga has reviewed.  In light of current Covid restrictions will reschedule MRI and clinic visit to mid/late summer.     Rhonda ARTEAGAN, HNBC, STAR-T  RN Care Coordinator  [facility]  Ph: [phone]  FAX: [phone]

## 2020-06-05 ENCOUNTER — COMMUNICATION - HEALTHEAST (OUTPATIENT)
Dept: INTERNAL MEDICINE | Facility: CLINIC | Age: 75
End: 2020-06-05

## 2020-06-05 DIAGNOSIS — F41.8 SITUATIONAL ANXIETY: ICD-10-CM

## 2020-07-25 ENCOUNTER — ANCILLARY PROCEDURE (OUTPATIENT)
Dept: MRI IMAGING | Facility: CLINIC | Age: 75
End: 2020-07-25
Attending: NURSE PRACTITIONER
Payer: COMMERCIAL

## 2020-07-25 DIAGNOSIS — N28.9 RENAL LESION: ICD-10-CM

## 2020-07-25 LAB
CREAT BLD-MCNC: 1 MG/DL (ref 0.52–1.04)
GFR SERPL CREATININE-BSD FRML MDRD: 54 ML/MIN/{1.73_M2}

## 2020-07-25 RX ORDER — GADOBUTROL 604.72 MG/ML
7.5 INJECTION INTRAVENOUS ONCE
Status: COMPLETED | OUTPATIENT
Start: 2020-07-25 | End: 2020-07-25

## 2020-07-25 RX ADMIN — GADOBUTROL 6 ML: 604.72 INJECTION INTRAVENOUS at 09:52

## 2020-07-27 ENCOUNTER — PATIENT OUTREACH (OUTPATIENT)
Dept: GASTROENTEROLOGY | Facility: CLINIC | Age: 75
End: 2020-07-27

## 2020-07-27 DIAGNOSIS — R10.9 ABDOMINAL PAIN: Primary | ICD-10-CM

## 2020-07-28 ENCOUNTER — ANCILLARY PROCEDURE (OUTPATIENT)
Dept: ULTRASOUND IMAGING | Facility: CLINIC | Age: 75
End: 2020-07-28
Attending: INTERNAL MEDICINE
Payer: COMMERCIAL

## 2020-07-28 ENCOUNTER — PATIENT OUTREACH (OUTPATIENT)
Dept: GASTROENTEROLOGY | Facility: CLINIC | Age: 75
End: 2020-07-28

## 2020-07-28 DIAGNOSIS — R10.9 ABDOMINAL PAIN: ICD-10-CM

## 2020-07-30 ENCOUNTER — TELEPHONE (OUTPATIENT)
Dept: GASTROENTEROLOGY | Facility: CLINIC | Age: 75
End: 2020-07-30

## 2020-07-30 NOTE — TELEPHONE ENCOUNTER
RUQ US shows no concerning change in previously noted gallbladder polyp. Lesion is only 3 mm in diameter. This does not require further follow-up.    Called and discussed with pt. She is doing well.    Rhonda - please cancel upcoming clinic visit.    She will still need a follow-up MRI (now in another 2 years) re her pancreas cyst and a clinic visit at that time.      ALYSIA Maradiaga MD  Professor of Medicine  Division of Gastroenterology, Hepatology and Nutrition  [facility]

## 2020-09-18 ENCOUNTER — TELEPHONE (OUTPATIENT)
Dept: INTERNAL MEDICINE | Facility: CLINIC | Age: 75
End: 2020-09-18

## 2020-09-18 DIAGNOSIS — E78.5 HYPERLIPIDEMIA LDL GOAL <100: ICD-10-CM

## 2020-09-18 DIAGNOSIS — M85.89 OSTEOPENIA OF MULTIPLE SITES: ICD-10-CM

## 2020-09-18 DIAGNOSIS — R68.89 COLD FEELING: Primary | ICD-10-CM

## 2020-09-18 NOTE — TELEPHONE ENCOUNTER
[facility] Call Center    Phone Message    May a detailed message be left on voicemail: yes     Reason for Call: Patient states that she is cold all the time and wondering if it is a thyroid thing and would like to speak with a nurse - please call.    Action Taken: Message routed to:  [facility]    Travel Screening: Not Applicable

## 2020-09-18 NOTE — TELEPHONE ENCOUNTER
I called the patient and left VM letting her know that she should get the labs done.   Regina Alvarenga, EMT at 3:15 PM on 9/18/2020.

## 2020-09-23 DIAGNOSIS — R68.89 COLD FEELING: ICD-10-CM

## 2020-09-23 DIAGNOSIS — E78.5 HYPERLIPIDEMIA LDL GOAL <100: ICD-10-CM

## 2020-09-23 DIAGNOSIS — M85.89 OSTEOPENIA OF MULTIPLE SITES: ICD-10-CM

## 2020-09-23 LAB
ALBUMIN SERPL-MCNC: 3.2 G/DL (ref 3.4–5)
ALP SERPL-CCNC: 41 U/L (ref 40–150)
ALT SERPL W P-5'-P-CCNC: 27 U/L (ref 0–50)
ANION GAP SERPL CALCULATED.3IONS-SCNC: 4 MMOL/L (ref 3–14)
AST SERPL W P-5'-P-CCNC: 16 U/L (ref 0–45)
BASOPHILS # BLD AUTO: 0 10E9/L (ref 0–0.2)
BASOPHILS NFR BLD AUTO: 0.7 %
BILIRUB SERPL-MCNC: 0.8 MG/DL (ref 0.2–1.3)
BUN SERPL-MCNC: 17 MG/DL (ref 7–30)
CALCIUM SERPL-MCNC: 8.9 MG/DL (ref 8.5–10.1)
CHLORIDE SERPL-SCNC: 106 MMOL/L (ref 94–109)
CO2 SERPL-SCNC: 29 MMOL/L (ref 20–32)
CREAT SERPL-MCNC: 1.15 MG/DL (ref 0.52–1.04)
DIFFERENTIAL METHOD BLD: NORMAL
EOSINOPHIL # BLD AUTO: 0.2 10E9/L (ref 0–0.7)
EOSINOPHIL NFR BLD AUTO: 4.4 %
ERYTHROCYTE [DISTWIDTH] IN BLOOD BY AUTOMATED COUNT: 12.6 % (ref 10–15)
ERYTHROCYTE [SEDIMENTATION RATE] IN BLOOD BY WESTERGREN METHOD: 7 MM/H (ref 0–30)
GFR SERPL CREATININE-BSD FRML MDRD: 46 ML/MIN/{1.73_M2}
GLUCOSE SERPL-MCNC: 97 MG/DL (ref 70–99)
HCT VFR BLD AUTO: 41.4 % (ref 35–47)
HGB BLD-MCNC: 13.5 G/DL (ref 11.7–15.7)
IMM GRANULOCYTES # BLD: 0 10E9/L (ref 0–0.4)
IMM GRANULOCYTES NFR BLD: 0.2 %
LYMPHOCYTES # BLD AUTO: 1.3 10E9/L (ref 0.8–5.3)
LYMPHOCYTES NFR BLD AUTO: 29.6 %
MCH RBC QN AUTO: 30.1 PG (ref 26.5–33)
MCHC RBC AUTO-ENTMCNC: 32.6 G/DL (ref 31.5–36.5)
MCV RBC AUTO: 92 FL (ref 78–100)
MONOCYTES # BLD AUTO: 0.5 10E9/L (ref 0–1.3)
MONOCYTES NFR BLD AUTO: 10.3 %
NEUTROPHILS # BLD AUTO: 2.4 10E9/L (ref 1.6–8.3)
NEUTROPHILS NFR BLD AUTO: 54.8 %
NRBC # BLD AUTO: 0 10*3/UL
NRBC BLD AUTO-RTO: 0 /100
PLATELET # BLD AUTO: 218 10E9/L (ref 150–450)
POTASSIUM SERPL-SCNC: 4 MMOL/L (ref 3.4–5.3)
PROT SERPL-MCNC: 6.2 G/DL (ref 6.8–8.8)
RBC # BLD AUTO: 4.48 10E12/L (ref 3.8–5.2)
SODIUM SERPL-SCNC: 140 MMOL/L (ref 133–144)
T4 FREE SERPL-MCNC: 1.05 NG/DL (ref 0.76–1.46)
TSH SERPL DL<=0.005 MIU/L-ACNC: 4.35 MU/L (ref 0.4–4)
WBC # BLD AUTO: 4.4 10E9/L (ref 4–11)

## 2020-09-27 ENCOUNTER — MYC MEDICAL ADVICE (OUTPATIENT)
Dept: INTERNAL MEDICINE | Facility: CLINIC | Age: 75
End: 2020-09-27

## 2020-09-27 DIAGNOSIS — N28.9 RENAL INSUFFICIENCY: Primary | ICD-10-CM

## 2020-10-08 ENCOUNTER — TELEPHONE (OUTPATIENT)
Dept: INTERNAL MEDICINE | Facility: CLINIC | Age: 75
End: 2020-10-08

## 2020-10-08 NOTE — TELEPHONE ENCOUNTER
[facility] Call Center    Phone Message    May a detailed message be left on voicemail: yes     Reason for Call: Other: Patient calling requesting when she should come in for kidney funtion tests and can orders be placed for that, Please call to discuss thank you.      Action Taken: Message routed to:  [facility]    Travel Screening: Not Applicable

## 2020-10-08 NOTE — TELEPHONE ENCOUNTER
Called patient:  Kidney function lab test recheck around 10/23/20.  Lab order placed already.  Test order: BMP only.  Patient gave verbal understanding.      Chad Sawyre CMA ([facility]) at 1:31 PM on 10/8/2020

## 2020-10-27 DIAGNOSIS — N28.9 RENAL INSUFFICIENCY: ICD-10-CM

## 2020-10-27 LAB
ANION GAP SERPL CALCULATED.3IONS-SCNC: 4 MMOL/L (ref 3–14)
BUN SERPL-MCNC: 19 MG/DL (ref 7–30)
CALCIUM SERPL-MCNC: 9.6 MG/DL (ref 8.5–10.1)
CHLORIDE SERPL-SCNC: 105 MMOL/L (ref 94–109)
CO2 SERPL-SCNC: 29 MMOL/L (ref 20–32)
CREAT SERPL-MCNC: 0.96 MG/DL (ref 0.52–1.04)
GFR SERPL CREATININE-BSD FRML MDRD: 57 ML/MIN/{1.73_M2}
GLUCOSE SERPL-MCNC: 92 MG/DL (ref 70–99)
POTASSIUM SERPL-SCNC: 4.2 MMOL/L (ref 3.4–5.3)
SODIUM SERPL-SCNC: 138 MMOL/L (ref 133–144)

## 2020-10-27 PROCEDURE — 80048 BASIC METABOLIC PNL TOTAL CA: CPT | Performed by: PATHOLOGY

## 2020-10-27 PROCEDURE — 36415 COLL VENOUS BLD VENIPUNCTURE: CPT | Performed by: PATHOLOGY

## 2020-11-03 ASSESSMENT — ENCOUNTER SYMPTOMS
DECREASED CONCENTRATION: 1
DEPRESSION: 0
PANIC: 0
NERVOUS/ANXIOUS: 1
INSOMNIA: 1

## 2020-11-03 ASSESSMENT — ACTIVITIES OF DAILY LIVING (ADL)
IN_THE_PAST_7_DAYS,_DID_YOU_NEED_HELP_FROM_OTHERS_TO_TAKE_CARE_OF_THINGS_SUCH_AS_LAUNDRY_AND_HOUSEKEEPING,_BANKING,_SHOPPING,_USING_THE_TELEPHONE,_FOOD_PREPARATION,_TRANSPORTATION,_OR_TAKING_YOUR_OWN_MEDICATIONS?: N
IN_THE_PAST_7_DAYS,_DID_YOU_NEED_HELP_FROM_OTHERS_TO_PERFORM_EVERYDAY_ACTIVITIES_SUCH_AS_EATING,_GETTING_DRESSED,_GROOMING,_BATHING,_WALKING,_OR_USING_THE_TOILET: N

## 2020-11-04 ENCOUNTER — OFFICE VISIT (OUTPATIENT)
Dept: INTERNAL MEDICINE | Facility: CLINIC | Age: 75
End: 2020-11-04
Payer: COMMERCIAL

## 2020-11-04 VITALS
HEART RATE: 69 BPM | SYSTOLIC BLOOD PRESSURE: 115 MMHG | RESPIRATION RATE: 16 BRPM | HEIGHT: 69 IN | WEIGHT: 134 LBS | TEMPERATURE: 98.2 F | DIASTOLIC BLOOD PRESSURE: 73 MMHG | OXYGEN SATURATION: 98 % | BODY MASS INDEX: 19.85 KG/M2

## 2020-11-04 DIAGNOSIS — F51.02 ADJUSTMENT INSOMNIA: ICD-10-CM

## 2020-11-04 DIAGNOSIS — E78.5 HYPERLIPIDEMIA LDL GOAL <100: ICD-10-CM

## 2020-11-04 DIAGNOSIS — N28.9 RENAL INSUFFICIENCY: ICD-10-CM

## 2020-11-04 DIAGNOSIS — Z12.11 SPECIAL SCREENING FOR MALIGNANT NEOPLASMS, COLON: Primary | ICD-10-CM

## 2020-11-04 LAB
ANION GAP SERPL CALCULATED.3IONS-SCNC: 5 MMOL/L (ref 3–14)
BUN SERPL-MCNC: 18 MG/DL (ref 7–30)
CALCIUM SERPL-MCNC: 9.2 MG/DL (ref 8.5–10.1)
CHLORIDE SERPL-SCNC: 106 MMOL/L (ref 94–109)
CHOLEST SERPL-MCNC: 165 MG/DL
CO2 SERPL-SCNC: 28 MMOL/L (ref 20–32)
CREAT SERPL-MCNC: 1.02 MG/DL (ref 0.52–1.04)
GFR SERPL CREATININE-BSD FRML MDRD: 53 ML/MIN/{1.73_M2}
GLUCOSE SERPL-MCNC: 93 MG/DL (ref 70–99)
HDLC SERPL-MCNC: 76 MG/DL
LDLC SERPL CALC-MCNC: 76 MG/DL
NONHDLC SERPL-MCNC: 89 MG/DL
POTASSIUM SERPL-SCNC: 4 MMOL/L (ref 3.4–5.3)
SODIUM SERPL-SCNC: 140 MMOL/L (ref 133–144)
TRIGL SERPL-MCNC: 67 MG/DL

## 2020-11-04 PROCEDURE — 36415 COLL VENOUS BLD VENIPUNCTURE: CPT | Performed by: PATHOLOGY

## 2020-11-04 PROCEDURE — 80048 BASIC METABOLIC PNL TOTAL CA: CPT | Performed by: PATHOLOGY

## 2020-11-04 PROCEDURE — 80061 LIPID PANEL: CPT | Performed by: PATHOLOGY

## 2020-11-04 PROCEDURE — G0439 PPPS, SUBSEQ VISIT: HCPCS | Performed by: INTERNAL MEDICINE

## 2020-11-04 RX ORDER — TRAZODONE HYDROCHLORIDE 50 MG/1
25 TABLET, FILM COATED ORAL
Qty: 60 TABLET | Refills: 1 | Status: SHIPPED | OUTPATIENT
Start: 2020-11-04 | End: 2021-12-21

## 2020-11-04 ASSESSMENT — MIFFLIN-ST. JEOR: SCORE: 1162.2

## 2020-11-04 ASSESSMENT — PAIN SCALES - GENERAL: PAINLEVEL: NO PAIN (0)

## 2020-11-04 NOTE — PROGRESS NOTES
Medicare Annual Wellness Visit Previsit note    This 75 year old year old female presents for a  Medicare Wellness Exam.           Medical Care     Have you been to an ER or a hospital in the last year? No  What other specialists or organizations are involved in your medical care?  [facility]  Current providers sharing in care for this patient include:  Patient Care Team       Relationship Specialty Notifications Start End    Tunde Painter MD PCP - General Internal Medicine  12/2/19     Phone: [phone] Fax: [phone]         [address] [facility] [address]    Tunde Painter MD Assigned PCP   5/21/20     Phone: [phone] Fax: [phone]         [address] [facility] [address]    Tunde Maradiaga MD Assigned Gastroenterology Provider   10/23/20     Phone: [phone] Fax: [phone]         [address] [facility] [address] [facility] [address]    Valerie Dumont MD Assigned Surgical Provider   10/23/20     Phone: [phone] Fax: [phone]         [address] [facility]  [facility] [address]                 Social History     Marital Status:  Who lives in your household? Alone  Does your home have any of the following safety concerns? Loose rugs in the hallway, no grab bars in the bathroom, no handrails on the stairs or have poorly lit areas?  No  Do you feel threatened or controlled by a partner, ex-partner or anyone in your life? No  Has anyone hurt you physically, for example by pushing, hitting, slapping or kicking you   or forcing you to have sex? No  Do you need help with the phone, transportation, shopping, preparing meals, housework, laundry, medications or managing money? No   Have you noticed any hearing difficulties? No      Risk Behaviors and Healthy Habits     How many servings of fruits and vegetables do you eat a day? 4 Servings  How often do you exercise and what do you do? 120 minutes 2 times a week  Do you frequently ride without  a seatbelt? No  Do you use tobacco?  No  Do you use any other drugs? No       Do you use alcohol?No        Sexual Health     Are you sexually active? No   If yes, with men, women, or both? N/A  If yes, do you more than one current partner?N/A  If yes, do you use condoms? N/A  Have you had any sexually transmitted infections? N/A  Any sexual concerns? No       FOR WOMEN ONLY  What year did you stop having periods? 1972  Any vaginal bleeding in the last year? No  Have you ever had an abnormal Pap smear? No

## 2020-11-04 NOTE — NURSING NOTE
Chief Complaint   Patient presents with     Physical     Patient comes in for a physical exam.          Leo Benoit MA on 11/4/2020 at 8:56 AM

## 2020-11-04 NOTE — PROGRESS NOTES
HPI  75-year-old female presents today for physical examination.  She reports substantial stress over the past year.  She did have her  with Alzheimer's disease placed in a memory care unit earlier in the year.  Then due to the pandemic she was unable to visit.  Subsequently she found that he was under nourished lost weight and significantly reduced in his physical functioning.  This caused her a substantial stress depression and difficulty sleeping.  She did make for new living arrangements for him which are better but nevertheless she still remains quite concerned about his care.  She has been doing some interval training as well as regular weightbearing exercise.  We discussed her DEXA scan which showed osteoporosis in the spine and calculated her FRAX score at 11% and 2.6% for hip fracture.  We discussed the Prolia as she has been intolerant of bisphosphonates in the past.  She elected to continue with lifestyle change in the calcium and vitamin D supplementation.  She had questions about her transient elevation in creatinine but this occurred after a workout and concedes it might have been related to dehydration.  She admits to low protein intake and has been addressing this recently with improved protein ingestion.  She continues to sleep poorly despite using melatonin regularly on a nightly basis and on a regular sleep schedule.  She is interested in additional pharmacological help in this regard.  Otherwise she appears to be doing well.  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     Subclinical hypothyroidism      Past Surgical History:   Procedure Laterality Date     bowel obstruction due to adhesions       HYSTERECTOMY       OOPHORECTOMY Bilateral      Family History   Problem Relation Age of Onset     Cardiomyopathy Father         viral  age 85     Social History     Socioeconomic History     Marital status:      Spouse name: None     Number of children: None     Years of education: None     Highest education level: None   Occupational History     None   Social Needs     Financial resource strain: None     Food insecurity     Worry: None     Inability: None     Transportation needs     Medical: None     Non-medical: None   Tobacco Use     Smoking status: Never Smoker     Smokeless tobacco: Never Used   Substance and Sexual Activity     Alcohol Use     Drinks per session: 1 or 2     Drug use: None     Sexual activity: None   Lifestyle     Physical activity     Days per week: None     Minutes per session: None     Stress: None   Relationships     Social connections     Talks on phone: None     Gets together: None     Attends [employer] service: None     Active member of club or organization: None     Attends meetings of clubs or organizations: None     Relationship status: None     Intimate partner violence     Fear of current or ex partner: None     Emotionally abused: None     Physically abused: None     Forced sexual activity: None   Other Topics Concern     None   Social History Narrative     None     Answers for HPI/ROS submitted by the patient on 11/3/2020   General Symptoms: No  Skin Symptoms: No  HENT Symptoms: No  EYE SYMPTOMS: No  HEART SYMPTOMS: No  LUNG SYMPTOMS: No  INTESTINAL SYMPTOMS: No  URINARY SYMPTOMS: No  GYNECOLOGIC SYMPTOMS: No  BREAST SYMPTOMS: No  SKELETAL SYMPTOMS: No  BLOOD SYMPTOMS: No  NERVOUS SYSTEM SYMPTOMS: No  MENTAL HEALTH SYMPTOMS: Yes  Nervous or Anxious: Yes  Depression: No  Trouble sleeping: Yes  Trouble thinking or  "concentrating: Yes  Mood changes: No  Panic attacks: No    Exam:  /73 (BP Location: Right arm, Patient Position: Sitting, Cuff Size: Adult Regular)   Pulse 69   Temp 98.2  F (36.8  C) (Oral)   Resp 16   Ht 1.753 m (5' 9\")   Wt 60.8 kg (134 lb)   SpO2 98%   BMI 19.79 kg/m    134 lbs 0 oz  PHYSICAL EXAMINATION:   The patient is alert, oriented with a clear sensorium.   Skin shows no lesions or rashes and good turgor.   Head is normocephalic and atraumatic.   Eyes show PERRLA.   Ears show normal TMs bilaterally.   Mouth shows clear oral mucosa.   Neck shows no nodes, thyromegaly or bruits.   Back is nontender.   Lungs are clear to percussion and auscultation.   Heart shows normal S1 and S2 without murmur or gallop.   Abdomen is soft, nontender without masses or organomegaly.   Extremities show no edema and no evidence of active synovitis.   Neurologic examination shows cranial nerves II-XII intact. Motor shows normal strength. Reflexes are full and symmetrical.       ASSESSMENT  1 hyperlipidemia on atorvastatin  2 osteoporosis on supplemental calcium and vitamin D FRAX 11% major fracture risk  3 pancreatic cysts needs follow-up next year  4 benign gallbladder polyp  5 Subclinical hypothyroidism  7 Hypoalbuminemia  8 Stress and anxiety  9 insomnia secondary to above  10 routine health maintenance will defer mammography till next year    Plan  Will start trazodone 25 mg at bedtime to help with sleep and potentially help reduce some of her stress symptoms.  She is due to have her lipids checked we will do FIT testing for colon cancer screening and due for her mammography till next year.    This note was completed using Dragon voice recognition software.      Tunde Painter MD  General Internal Medicine  [facility]  [phone]        "

## 2020-11-04 NOTE — PATIENT INSTRUCTIONS
[facility] Medication Refill Request Information:  * Please contact your pharmacy regarding ANY request for medication refills.  ** [facility] Prescription Fax = [phone]  * Please allow 3 business days for routine medication refills.  * Please allow 5 business days for controlled substance medication refills.     [facility] Test Result notification information:  *You will be notified with in 7-10 days of your appointment day regarding the results of your test.  If you are on MyChart you will be notified as soon as the provider has reviewed the results and signed off on them.    [facility]: [phone]

## 2020-11-05 DIAGNOSIS — Z12.11 SPECIAL SCREENING FOR MALIGNANT NEOPLASMS, COLON: ICD-10-CM

## 2020-11-05 PROCEDURE — 99000 SPECIMEN HANDLING OFFICE-LAB: CPT | Performed by: PATHOLOGY

## 2020-11-05 PROCEDURE — 82274 ASSAY TEST FOR BLOOD FECAL: CPT | Mod: 90 | Performed by: PATHOLOGY

## 2020-11-08 LAB — HEMOCCULT STL QL IA: NEGATIVE

## 2020-12-02 DIAGNOSIS — E78.5 HYPERLIPIDEMIA LDL GOAL <100: ICD-10-CM

## 2020-12-04 ENCOUNTER — MYC MEDICAL ADVICE (OUTPATIENT)
Dept: INTERNAL MEDICINE | Facility: CLINIC | Age: 75
End: 2020-12-04

## 2020-12-04 RX ORDER — ATORVASTATIN CALCIUM 20 MG/1
20 TABLET, FILM COATED ORAL DAILY
Qty: 100 TABLET | Refills: 3 | Status: SHIPPED | OUTPATIENT
Start: 2020-12-04 | End: 2021-12-01

## 2020-12-04 NOTE — TELEPHONE ENCOUNTER
ATORVASTATIN 20 MG TABLET  Last Written Prescription Date:  12/2/2019  Last Fill Quantity: 100,   # refills: 3  Last Office Visit : 11/4/2020  Future Office visit:  None  100 Tabs, 3 Refills sent to pharm 12/4/2020      Sis Vasquez RN  Central Triage Red Flags/Med Refills

## 2020-12-05 NOTE — TELEPHONE ENCOUNTER
Addressed in alternate encounter/ refill 12/4/20    Atorvastin 20mg  Last Written Prescription Date:  12/4/20  Last Fill Quantity: 100,   # refills: 3    [id number] [place] [facility]

## 2020-12-13 ENCOUNTER — MYC MEDICAL ADVICE (OUTPATIENT)
Dept: INTERNAL MEDICINE | Facility: CLINIC | Age: 75
End: 2020-12-13

## 2020-12-13 DIAGNOSIS — Z20.822 EXPOSURE TO COVID-19 VIRUS: Primary | ICD-10-CM

## 2020-12-14 ENCOUNTER — HOSPITAL ENCOUNTER (OUTPATIENT)
Dept: NEUROLOGY | Facility: CLINIC | Age: 75
Setting detail: THERAPIES SERIES
Discharge: STILL ADMITTED OR ADMINISTRATIVE | End: 2020-12-14
Attending: PSYCHOLOGIST

## 2020-12-14 ENCOUNTER — TELEPHONE (OUTPATIENT)
Dept: INTERNAL MEDICINE | Facility: CLINIC | Age: 75
End: 2020-12-14

## 2020-12-14 DIAGNOSIS — F43.23 ADJUSTMENT DISORDER WITH MIXED ANXIETY AND DEPRESSED MOOD: ICD-10-CM

## 2020-12-14 NOTE — TELEPHONE ENCOUNTER
[facility] Call Center    Phone Message    May a detailed message be left on voicemail: yes     Reason for Call: Other: Call Back: Patient reports her  tested positive for COVID and was exposed to him about for about an hour a day. Patient states she asked around and all of the places that offered COVID testing did the antigen test. Patient states she heard the antigen test is not reliable. Patient heard PCR test is the best. Patient would like to discuss this with Dr. Painter's Nurse. Please call back to advise.     Action Taken: Message routed to:  [facility]    Travel Screening: Not Applicable

## 2020-12-15 DIAGNOSIS — Z20.822 EXPOSURE TO COVID-19 VIRUS: ICD-10-CM

## 2020-12-15 PROCEDURE — U0003 INFECTIOUS AGENT DETECTION BY NUCLEIC ACID (DNA OR RNA); SEVERE ACUTE RESPIRATORY SYNDROME CORONAVIRUS 2 (SARS-COV-2) (CORONAVIRUS DISEASE [COVID-19]), AMPLIFIED PROBE TECHNIQUE, MAKING USE OF HIGH THROUGHPUT TECHNOLOGIES AS DESCRIBED BY CMS-2020-01-R: HCPCS | Performed by: INTERNAL MEDICINE

## 2020-12-15 NOTE — TELEPHONE ENCOUNTER
Patient called back to state that she received a call stating she is positive for COVID-19. Patient would like to know if she should get retested per Dr. Painter's note. ARMINDA 12/15/20 11:29am

## 2020-12-15 NOTE — TELEPHONE ENCOUNTER
Health Call Center    Phone Message    May a detailed message be left on voicemail: yes     Reason for Call: Other: Call Back: Patient called back and states she was exposed on Wednesday 12/09/2020. Patient states she has none of the symptoms listed. Patient did report she got a Antigen COVID test this morning 12/15/2020. Patient would still like to speak to Dr. Painter's Nurse. Patient reports that her  is also asymptomatic. Please call back to advise.     Action Taken: Message routed to:  [facility]    Travel Screening: Not Applicable

## 2020-12-15 NOTE — TELEPHONE ENCOUNTER
Left message for patient to call back to answer a few questions.      When patient calls back, please ask:  -Have you have exposed to a confirm covid person within 14 days? If yes, when?    -Are you experiencing any covid symptoms?  Fever or chills  New cough  Shortness of breath or difficulty breathing  Fatigue  Muscle or body aches  Headache  New loss of taste or smell  Sore throat  Congestion or runny nose  Nausea or vomiting  Diarrhea      If patient is experiencing any covid symptoms, she will need to be evaluated.  This can be done at [email or web address] or a virtual appt only (video or telephone) with any providers available.    If patient is asymptomatic, please route message back to us.      Chad Sawyer CMA ([facility]) at 10:06 AM on 12/15/2020

## 2020-12-16 LAB
SARS-COV-2 RNA SPEC QL NAA+PROBE: NOT DETECTED
SPECIMEN SOURCE: NORMAL

## 2020-12-26 DIAGNOSIS — F51.02 ADJUSTMENT INSOMNIA: ICD-10-CM

## 2021-01-01 RX ORDER — TRAZODONE HYDROCHLORIDE 50 MG/1
TABLET, FILM COATED ORAL
Qty: 30 TABLET | Refills: 3 | OUTPATIENT
Start: 2021-01-01

## 2021-04-27 ENCOUNTER — MYC MEDICAL ADVICE (OUTPATIENT)
Dept: INTERNAL MEDICINE | Facility: CLINIC | Age: 76
End: 2021-04-27

## 2021-05-28 NOTE — TELEPHONE ENCOUNTER
RN cannot approve Refill Request    RN can NOT refill this medication PCP messaged that patient is overdue for Office Visit. Last office visit: Visit date not found Last Physical: Visit date not found Last MTM visit: Visit date not found Last visit same specialty: 10/18/2017 Tunde Darling MD.  Next visit within 3 mo: Visit date not found  Next physical within 3 mo: Visit date not found      Christy Perkins, [facility] Triage/Med Refill 5/12/2019    Requested Prescriptions   Pending Prescriptions Disp Refills     simvastatin (ZOCOR) 20 MG tablet [Pharmacy Med Name: SIMVASTATIN 20 MG TABLET] 90 tablet 0     Sig: TAKE 1 TABLET BY MOUTH EVERY DAY IN THE EVENING       Statins Refill Protocol (Hmg CoA Reductase Inhibitors) Failed - 5/11/2019  8:41 AM        Failed - PCP or prescribing provider visit in past 12 months      Last office visit with prescriber/PCP: Visit date not found OR same dept: Visit date not found OR same specialty: 10/18/2017 Tunde Darling MD  Last physical: Visit date not found Last MTM visit: Visit date not found   Next visit within 3 mo: Visit date not found  Next physical within 3 mo: Visit date not found  Prescriber OR PCP: Leon Burden MD  Last diagnosis associated with med order: 1. Hyperlipidemia LDL goal <100  - simvastatin (ZOCOR) 20 MG tablet [Pharmacy Med Name: SIMVASTATIN 20 MG TABLET]; TAKE 1 TABLET BY MOUTH EVERY DAY IN THE EVENING  Dispense: 90 tablet; Refill: 0    If protocol passes may refill for 12 months if within 3 months of last provider visit (or a total of 15 months).

## 2021-05-30 VITALS — WEIGHT: 137.12 LBS | BODY MASS INDEX: 20.31 KG/M2 | HEIGHT: 69 IN

## 2021-05-30 VITALS — HEIGHT: 69 IN | WEIGHT: 141 LBS | BODY MASS INDEX: 20.88 KG/M2

## 2021-05-31 VITALS — HEIGHT: 69 IN | BODY MASS INDEX: 20.89 KG/M2 | WEIGHT: 141.04 LBS

## 2021-05-31 VITALS — WEIGHT: 139 LBS | HEIGHT: 69 IN | BODY MASS INDEX: 20.59 KG/M2

## 2021-06-06 NOTE — PROGRESS NOTES
"Psychology Progress Note    Date: 2/25/2020    Time length and type of treatment: 4359-7034 , individual therapy    Necessity: This session is necessary to address increased features of anxiety and depression.  Patient was last seen on 7/2/2018.  We will update the patient's treatment plan at her next visit.   The reader is invited to review the patient's full treatment plan in the Media section of the patient's Epic medical record.    Intervention: Patient reports that she placed her  in memory care in December, 2019.  She describes a very difficult year where her  had numerous falls, required surgery for a broken wrist, and they participated in an experimental program in [place] using hyperbaric oxygen.  Patient indicates that her  did not improve.  Because he was needing constant supervision, the patient decided to place her  in memory care.    Patient reports participating in yoga, meditation, exercise, seeing friends, and a support group.  However, she states \"I am crying all the time and I feel like I am falling apart\".  We explore how the patient may be mourning in a way that she has not allowed herself to do thus far.  We discussed that she spent most of 2019 engaged in finding therapies from which she hoped that her  would be better.  We discussed that her acceptance that his situation will not change and will continue to get worse is very difficult to face.  This writer reassures the patient that she is coping well and at the same time feels profound sadness at what is happening.    This writer utilized motivational interviewing, active listening, reassurance and support in the context of cognitive behavioral therapy and ACT therapy to address the above.      Mental Status: Orientation to person, place, and time is in tact.  Recent and remote memory, attention, concentration, language, and fund of knowledge are intact.  Mood appears stable with tearful affect.  No " "indication of suicidal ideation, plan, or intent.  No indication of homicidal ideation, plan or intent.    Progress: Patient reports feeling like she is \"falling apart\".  Progress is slow with acceptance of her situation and allowing herself time to grieve.    Plan: Patient will return in 1 month where we will continue with cognitive behavioral therapy to promote adaptive coping and learning her 's diagnosis of Alzheimer's.  Estimated duration of treatment is 3 individual therapy sessions (14065) at 1 month intervals.   Treatment is expected to be completed by 6/1/2020.  Research indicates that  caregivers are vulnerable to psychiatric and medical morbidities associated with the stress of caregiving.  Support has been found to stabilize these events    Diagnosis: Adjustment disorder with depressed and anxious mood  "

## 2021-06-08 NOTE — PROGRESS NOTES
"  Office Visit - Follow Up   Zelda Osman   71 y.o. female    Date of Visit: 2/3/2017    Chief Complaint   Patient presents with     Cough     Cough and cold sx x 3 weeks, tickle in throat and cough keeps her awake at night        Assessment and Plan   1. URI (upper respiratory infection)  I did suggest she should continue taking her fluticasone nasal spray push fluids.  Use Mucinex DM.  Sent in a prescription also for Flovent   g 2 puffs inhalation twice per day.  Will only pick this up if it is unaffordable at the pharmacy.  I do not think she needs antibiotics at this time.        No Follow-up on file.     History of Present Illness   This 71 y.o. old reports an onset of an upper respiratory infection began about 3 weeks ago.  He has had a cough that has been mostly dry is just lingering on for the past 3 weeks.  She been using Mucinex DM and this helps some.  Cough is particularly bad at night.  She is getting the point where she seems to be coughing so much of the day that she gets tired.  No fevers no chills.    Review of Systems: A comprehensive review of systems was negative except as noted.     Medications, Allergies and Problem List   Reviewed and updated     Physical Exam   General Appearance:       Visit Vitals     /60 (Patient Site: Right Arm, Patient Position: Sitting)     Pulse 80     Ht 5' 9\" (1.753 m)     Wt 141 lb (64 kg)     LMP 06/30[birth year]1971 (Approximate)     SpO2 99%     BMI 20.82 kg/m2       Lungs sound entirely clear.  No wheezes rales or rhonchi are heard.  Mouth and throat are negative.  Neck shows no adenopathy.  She does cough quite a bit during the exam and it is a dry moderate cough      Additional Information   Current Outpatient Prescriptions   Medication Sig Dispense Refill     fluticasone (FLONASE) 50 mcg/actuation nasal spray 1 spray into each nostril daily.       NON FORMULARY Take 0.5 tablets by mouth 4 (four) times a week. Optimox Iodoral 12.5mg tablet - " Contains: Iodine 5 mg, Iodide (as potassium salt) 7.5 mg.       UNABLE TO FIND Med Name: Bone support formula (calcium citrate 1000 mg., magesium glycinate 400, vitamin D3 6000, boron 3 mg. , vitamin K1 2 mg./ tsp.) sig 1/2 sp.  mL 11     UNABLE TO FIND Med Name: Compounded bioidentical hormone cream,1.2 mg estradiol, 3 mg estriol, 50 mg progesterone, 0.7 mg testosterone.Sig: Apply 1 g topically daily.  From [facility] 60 g 5     fluticasone (FLOVENT HFA) 220 mcg/actuation inhaler Inhale 2 puffs 2 (two) times a day. 1 Inhaler 2     No current facility-administered medications for this visit.      Allergies   Allergen Reactions     Dulera [Mometasone-Formoterol] Other (See Comments)     Weight loss, jittery, fatigue     House Dust      Social History   Substance Use Topics     Smoking status: Never Smoker     Smokeless tobacco: None     Alcohol use No      Comment: rare       Review and/or order of clinical lab tests:  Review and/or order of radiology tests:  Review and/or order of medicine tests:  Discussion of test results with performing physician:  Decision to obtain old records and/or obtain history from someone other than the patient:  Review and summarization of old records and/or obtaining history from someone other than the patient and.or discussion of case with another health care provider:  Independent visualization of image, tracing or specimen itself:    Time: total time spent with the patient was 15 minutes of which >50% was spent in counseling and coordination of care     Stevie Black MD

## 2021-06-08 NOTE — TELEPHONE ENCOUNTER
Dr. Darling,    Patient is seeing a new provider and has not seen you since 2017.    Terri LEVINE LPN .......... 9:29 AM  06/11/20  [facility]

## 2021-06-08 NOTE — TELEPHONE ENCOUNTER
Controlled Substance Refill Request  Medication Name:   Requested Prescriptions     Pending Prescriptions Disp Refills     ALPRAZolam (XANAX) 0.25 MG tablet [Pharmacy Med Name: ALPRAZOLAM 0.25 MG TABLET] 60 tablet 0     Sig: TAKE 1-2 TABLETS BY MOUTH AT BEDTIME AS NEEDED FOR SLEEP.     Date Last Fill: 6/29/18  [facility]  Submit electronically to pharmacy  Controlled Substance Agreement on file:   Encounter-Level CSA Scan Date:    There are no encounter-level csa scan date.        Last office visit:  10/18/17

## 2021-06-10 NOTE — PROGRESS NOTES
OFFICE VISIT NOTE    Subjective:   Chief Complaint:  Asthma (Follow up)    Cough has resolved.  Flovent was energizing and gave her insomnia  Is taking Flonase for allergic rhinitis.  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    Medications:  Current Outpatient Prescriptions on File Prior to Visit   Medication Sig     fluticasone (FLONASE) 50 mcg/actuation nasal spray 1 spray into each nostril daily.     NON FORMULARY Take 0.5 tablets by mouth 4 (four) times a week. Optimox Iodoral 12.5mg tablet - Contains: Iodine 5 mg, Iodide (as potassium salt) 7.5 mg.     UNABLE TO FIND Med Name: Bone support formula (calcium citrate 1000 mg., magesium glycinate 400, vitamin D3 6000, boron 3 mg. , vitamin K1 2 mg./ tsp.) sig 1/2 sp. BID     UNABLE TO FIND Med Name: Compounded bioidentical hormone cream,1.2 mg estradiol, 3 mg estriol, 50 mg progesterone, 0.7 mg testosterone.Sig: Apply 1 g topically daily.  From [facility]     [DISCONTINUED] fluticasone (FLOVENT HFA) 220 mcg/actuation inhaler Inhale 2 puffs 2 (two) times a day.     No current facility-administered medications on file prior to visit.      Allergies:  Allergies   Allergen Reactions     Dulera [Mometasone-Formoterol] Other (See Comments)     Weight loss, jittery, fatigue     House Dust        Review of Systems:     Extensive 10-point review of systems was performed. Please see the HPI for problem specific pertinent review of systems.     Patient does note she feels well.  Sleeping good at this time    Otherwise, the following systems are noncontributory including constitutional, eyes, ears, nose and throat, cardiovascular, respiratory, gastrointestinal, genitourinary, musculoskeletal,neurological, skin and/or breast, endocrine, hematologic/lymph, allergic/immunologic and psychiatric.      Objective:    /64  "(Patient Site: Left Arm, Patient Position: Sitting, Cuff Size: Adult Regular)  Pulse 84  Ht 5' 9\" (1.753 m)  Wt 137 lb 1.9 oz (62.2 kg)  LMP 06/30/1971 (Approximate)  SpO2 98%  Breastfeeding? No  BMI 20.25 kg/m2  Weight:   Wt Readings from Last 3 Encounters:   04/14/17 137 lb 1.9 oz (62.2 kg)   02/03/17 141 lb (64 kg)   10/20/16 141 lb (64 kg)     [unfilled]  137 lb 1.9 oz (62.2 kg)    In general, no acute distress.  Neck without adenopathy  Lungs clear  Cardiac regular without murmur    No results found.        Assessment/Plan for  Zelda Osman is a 71 y.o. female.  No Patient Care Coordination Note on file.       1.  Allergic rhinitis as needed Flonase  2.  Cough-resolved  3.  Abnormal chest x-ray-hyperexpanded lung with normal pulmonary functions and excellent peak flows.  No evidence of any chronic lung disease    Plan:  Flonase-she is seasonal then as needed  With schedule wellness exam.  No blood work for a few years.      Diagnoses and all orders for this visit:    Allergic rhinitis    Cough        Tunde Darling MD  Internal medicine  [facility]  [phone]  [email or web address]    This is an electronically verified report by Tunde Darling M.D.  (Note created with Dragon voice recognition and unintended spelling errors and word substitutions may occur)     "

## 2021-06-12 NOTE — PROGRESS NOTES
Cholesterol is high  I believe she should be on statin medication  She does report a coronary calcium score in the past which was not normal-probably greater than 20 years ago.  This is for primary prevention  She understands  Rx 20 mg simvastatin  Check lipids on RTC 6-8 weeks

## 2021-06-12 NOTE — PROGRESS NOTES
Subject: Test Results Question    After your phone call today, I found my coronary calcium report from 9/21/00. Total calcium score was 3 with 0 in left main coronary artery, 3 in left anterior descending coronary artery, 0 in the circumflex artery and 0 in the right coronary artery.    I'll start the statin you prescribed and see you on Oct 18th.           Coronary calcium score

## 2021-06-12 NOTE — PROGRESS NOTES
Assessment and Plan:        1. Wellness examination  The patient's current medical problems were reviewed.    Unremarkable exam.  Post menopausal woman on natural pathic transdermal hormonal creams.  Followed by Dr. Polo.  Recent pelvic breast check.    2. Situational anxiety   Corey with frontal temporal dementia-apathy type.  With insomnia and generalized anxiety/grief.  Xanax at this time.  Consider SSRI.  Consider BuSpar.  Will follow-up in 4-6 weeks                  36 hour day  Book    [employer]  [email or web address]  [address] [address]  [phone]    In addition to the wellness examination,  This provider spent greater than 40 min. face-to-face time with the patient and/or his family.  More than half this time was spent in counseling and or coordination of care with other providers or agencies which were consistent with the nature of this patient's problems which are listed and described in the assessment and plan.        Tunde Darling MD  Internal medicine  [facility]  [phone]  [email or web address]        The following health maintenance schedule was reviewed with the patient and provided in printed form in the after visit summary:   Health Maintenance   Topic Date Due     ASTHMA FOLLOW-UP  1945     INFLUENZA VACCINE RULE BASED (1) 08/01/2017     DXA SCAN  10/06/2017     ASTHMA CONTROL TEST  04/14/2018     FALL RISK ASSESSMENT  04/14/2018     MAMMOGRAM  10/04/2018     ADVANCE DIRECTIVES DISCUSSED WITH PATIENT  07/14/2019     TD 18+ HE  07/14/2024     COLONOSCOPY  11/14/2024     PNEUMOCOCCAL POLYSACCHARIDE VACCINE AGE 65 AND OVER  Completed     PNEUMOCOCCAL CONJUGATE VACCINE FOR ADULTS (PCV13 OR PREVNAR)  Completed     ZOSTER VACCINE  Completed        Subjective:   Chief Complaint: Zelda Osman is an 72 y.o. female here for an Annual Wellness visit.   HPI: Patient here for wellness     Corey has  frontotemporal dementia  Progressive  Devastated  Trouble sleeping  Has used Xanax in the past to help her sleep  Has had information from [facility] regarding Alzheimer's association  Neighbor/confident Dr. Lynn-retired neurologist whose  has dementia  Considering psychologist    On natural pathic estrogen hormone replacement.  Followed by physician.  Has breast pelvic  Annual.  Has not had bone density.    Review of Systems: Asthma has been quiesced sent  She thinks a lot of it was exposure to molds in the garden  Perennial [place] garden[place] etc. please see above.  The rest of the review of systems are negative for all systems.    Patient Care Team:  Tunde Darling MD as PCP - General (Internal Medicine)     Patient Active Problem List   Diagnosis     Alopecia     Hyperlipidemia     Primary Atypical Mycoplasma Pneumoniae     Osteopenia     Abdominal Adhesions     Cough     Bloating     Adverse Food Reaction (Not Anaphylactic)     Premature Menopause     Seborrheic Keratosis     Nausea and vomiting     Past Medical History:   Diagnosis Date     Abdominal adhesions      Asthmatic bronchitis      H/O Clostridium difficile infection 2011     HLD (hyperlipidemia)      Osteopenia      Ovarian cancer 1971       History of  Ovarian Cancer V10.43     Small bowel obstruction       Past Surgical History:   Procedure Laterality Date     BILATERAL SALPINGOOPHORECTOMY       HYSTERECTOMY       ID APPENDECTOMY      Description: Appendectomy;  Recorded: 09/25/2011;     ID REMOVAL OF OVARY(S)      Description: Oophorectomy;  Recorded: 09/25/2011;     ID REMOVAL OF TONSILS,<11 Y/O      Description: Tonsillectomy;  Recorded: 08/18/2011;      Family History   Problem Relation Age of Onset     Cardiomyopathy Father      Prostate cancer Father       Social History     Social History     Marital status:      Spouse name: N/A     Number of children: N/A     Years of education: N/A     Occupational History     Not on file.  "    Social History Main Topics     Smoking status: Never Smoker     Smokeless tobacco: Never Used     Alcohol use No      Comment: rare     Drug use: No     Sexual activity: Not on file     Other Topics Concern     Not on file     Social History Narrative      Current Outpatient Prescriptions   Medication Sig Dispense Refill     ESTRADIOL, BULK, MISC Use As Directed.       fexofenadine (ALLEGRA) 60 MG tablet Take 60 mg by mouth daily.       fluticasone (FLONASE) 50 mcg/actuation nasal spray 1 spray into each nostril as needed.        melatonin 5 mg Tab tablet Take 5 mg by mouth at bedtime as needed.       NON FORMULARY Take 0.5 tablets by mouth 4 (four) times a week. Optimox Iodoral 12.5mg tablet - Contains: Iodine 5 mg, Iodide (as potassium salt) 7.5 mg.       UNABLE TO FIND Med Name: Bone support formula (calcium citrate 1000 mg., magesium glycinate 400, vitamin D3 6000, boron 3 mg. , vitamin K1 2 mg./ tsp.) sig 1/2 sp.  mL 11     UNABLE TO FIND Med Name: Compounded bioidentical hormone cream,1.2 mg estradiol, 3 mg estriol, 50 mg progesterone, 0.7 mg testosterone.Sig: Apply 1 g topically daily.  From [facility] 60 g 5     No current facility-administered medications for this visit.       Objective:   Vital Signs:   Visit Vitals     /72 (Patient Site: Left Arm, Patient Position: Sitting, Cuff Size: Adult Regular)     Pulse 75     Resp 14     Ht 5' 9\" (1.753 m)     Wt 139 lb (63 kg)     LMP 06/30/1971 (Approximate)     SpO2 99%     Breastfeeding No     BMI 20.53 kg/m2        VisionScreening:  No exam data present     PHYSICAL EXAM  Skin: Normal. No rash or lesion  Lymph Nodes: None palpable-including neck, axilla, inguinal, epitrochlear.  Head:  Normocephalic.    Eyes: Midline.  Equal size., full ROM.  External exams normal.  No icterus  Ears:  Normal pinnae, canals, and TM's.    Nose:  Patent, without deformity.    Throat:  Moist mucous membranes without lesions, erythema, or exudate.    Neck: " No palpable masses, lymphadenopathy or tenderness.No thyromegaly or goiter.  No thyroid nodule.  Carotid Arteries:  No Bruit.  Carotid upstroke normal  Chest Wall: No deformity or pain elicited on compression.  Respiratory:  Normal respiratory effort.  Lungs are clear with good breath sounds.  No dullness.  No wheezing.  Heart: Regular rhythm.  Normal sounding S1, S2 without S3, S4, murmurs, rubs, or gallops.    Abdomen:  The abdomen was flat, soft and nontender without guarding rebound or masses.  There are normal bowel sounds.  There is no hepatosplenomegaly.  There is no palpable enlargement of the aorta.  Extremities:  Full ROM without limitation, deformity or edema.    4+ pulses  Neurologic-intact- No focal deficit.  Speech clear.  Coordination normal.  Strength symmetric  Orthopedic-no arthropathy.      Assessment Results 8/7/2017   Activities of Daily Living No help needed   Instrumental Activities of Daily Living No help needed   Get Up and Go Score Less than 12 seconds   Mini Cog Total Score 5   Some recent data might be hidden     A Mini-Cog score of 0-2 suggests the possibility of dementia, score of 3-5 suggests no dementia    Identified Health Risks:     The patient's KAREL-7 score is consistent with probably anxiety disorder.  She was provided with patient information regarding anxiety and was advised to set up a follow up appointment in 6-8 weeks to further address this issue.  Patient's advanced directive was discussed and I am comfortable with the patient's wishes.

## 2021-06-12 NOTE — PROGRESS NOTES
"Initial Psychology Consultation (Standard)    Zelda Osman  [id number]  Consult Date: 8/29/2017    Reason for Referral:  The patient is a 72 y.o. year old, ,  individual who is self-referred for an evaluation of  emotional, and behavioral functioning.  This writer originally had the opportunity to meet the patient when she accompanied her  for a psychological evaluation.  It was determined at that time that the patient would benefit more from participating in psychology services than her  who had limited ability to participate secondary to dementia.  Collateral information was obtained from a review of [facility]'s medical record.   Patient was informed of the role of psychology services, the foreseeable risks and benefits, the limits of confidentiality, and the responsibilities of a mandated .   The patient agreed to proceed.  Patient's  is aware that she is meeting with this writer today.    History of Presenting problem: The patient describes severe insomnia that nothing seems to help.  She describes trialing melatonin, marisol chi, yoga, Xanax, ambien, and meditation among other things.  Patient acknowledges that she is a person who tends to see the \"glass as half empty\".  The patient describes her feeling of devastation that her \"old life is gone\" now that her  has been diagnosed with dementia (etiology not yet specified).  The patient and her  are very appreciative of this writer's effort to refer the patient's  to Mathieu Mason MD with the [facility]'s Outpatient services.  The patient indicates that it has been very comforting to have a physician who is spending time explaining what may be happening to her .  She states \"I know that there is still something wrong with my  and that there cannot be a good outcome but it still feels better to have a physician discuss it with us.\"    The patient describes awareness of " "the strategies for managing stress.  She states \"I know that it is very important how one perceives stress and that can impact how one experiences stress\".  She indicates her understanding that the stress of the last 8 months in which her  and she have had numerous doctors appointments contributes to her poor sleep.  The patient reports that her usual routine in which she has had good balance between meaningful activity and rejuvenating activity has been abandoned as a result of the numerous doctors appointments and appointments with their  to make sure that all the paperwork is in order.  We discussed the importance of reinstating a more normal routine for both patient and her .  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  We discussed the importance of structure, consistency of routine for her  and herself.  We explore the balance between stimulation and rest.  We also discuss how the patient has time to get things done and that doctors appointments can be organized in such a way that they limit themselves to 1 appointment a week.  We discussed the difference between running a short race versus a marathon and how it applies to her situation.    The patient acknowledges sad mood and frequent worry.  She denies hopelessness, worthlessness, helplessness.  She denies suicidal ideation.  She acknowledges irritability.    No standardized assessment tools were administered.    No non-personal contextual factors are reported.    Social History: The patient lives in her own home with her .  She achieved a master's in business administration.  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  Patient reports an excellent system of support from friends.  She does not report a significant role for her spiritual beliefs.  She reports no " history of abuse.  She reports no economic concerns.  She achieved developmental milestones in the expected fashion.    Medical History: Patient reports being in generally good physical health.  The patient does not report cultural factors impacting pursuit of care.  The patient pursues standard medical care.  Patient's history is significant for hyperlipidemia, ovarian cancer in 1971, and a history of small bowel obstruction and herniated disc.  Family medical history is significant primarily for her father with prostate cancer.    Medications include:    Current Outpatient Prescriptions   Medication Sig     ALPRAZolam (XANAX) 0.25 MG tablet Take 1-2 tablets (0.25-0.5 mg total) by mouth at bedtime as needed for sleep.     ESTRADIOL, BULK, MISC Use As Directed.     fexofenadine (ALLEGRA) 60 MG tablet Take 60 mg by mouth daily.     fluticasone (FLONASE) 50 mcg/actuation nasal spray 1 spray into each nostril as needed.      melatonin 5 mg Tab tablet Take 5 mg by mouth at bedtime as needed.     NON FORMULARY Take 0.5 tablets by mouth 4 (four) times a week. Optimox Iodoral 12.5mg tablet - Contains: Iodine 5 mg, Iodide (as potassium salt) 7.5 mg.     simvastatin (ZOCOR) 20 MG tablet Take 1 tablet (20 mg total) by mouth every evening.     UNABLE TO FIND Med Name: Bone support formula (calcium citrate 1000 mg., magesium glycinate 400, vitamin D3 6000, boron 3 mg. , vitamin K1 2 mg./ tsp.) sig 1/2 sp. BID     UNABLE TO FIND Med Name: Compounded bioidentical hormone cream,1.2 mg estradiol, 3 mg estriol, 50 mg progesterone, 0.7 mg testosterone.Sig: Apply 1 g topically daily.  From [facility]       Psychiatric History: Patient reports no history significant for mental health or chemical health issues.  She reports no family history significant for mental health or chemical health issues.  A CAGE Questionnaire was not administered secondary to absence of reported chemical use history.    Mental Status Exam:    Grooming:  " Well-groomed    Attire:  Appropriate    Age:  appears stated age    Attitude during interview: friendly    Participation: cooperative     Motor activity:  Within normal limits    Eye contact:  appropriate    Mood:  Sad    Affect:  Tearful    Speech/language:  Within normal limits    Attention:  Within normal limits    Concentration:  within normal limits    Thought Process:  Within normal limits    Thought Content:  Within normal limits    Orientation:  Orientated to person time and place    Memory:  No evidence of impairment.    Judgement: No evidence of impairment    Estimated intelligence:  high    Demonstrated insight:  normal    Fund of Knowledge: Good      Clinical Summary: Patient is a 72-year-old, ,  individual who is self-referred for support in her role as caregiver for her  with dementia.  The patient reports a long process of numerous doctors appointments trying to obtain a diagnosis for her  since January, 2017.  Recently her  was diagnosed with dementia (initially diagnosed with frontotemporal dementia now possibly being diagnosed with Alzheimer's).  Since January, 2017 as a result of this extended process of obtaining a diagnosis for her , the patient has abandoned her usual routine which consisted of a stable balance between meaningful activity, social interaction, and restful activity.  Patient now reports severe insomnia and an increase in irritability associated with the stress of caregiving for her , clarifying his diagnosis, and grieving the loss of their \"old way of life\".    We discussed the importance of reestablishing a more normal routine for both patient and her .   We discussed that this might be the most effective strategy for resuming higher quality sleep.    Patient strengths include excellent insight regarding the challenges and loss associated with her situation and no premorbid history significant for mental health or " chemical health issues.    Diagnosis: Adjustment disorder with depressed and anxious mood      Plan: Patient will return on an as-needed basis where we will continue with cognitive behavioral therapy to promote adaptive coping with caregiver role and navigating the grief associated with being a caregiver for her  with progressive dementia.

## 2021-06-13 NOTE — PROGRESS NOTES
OFFICE VISIT NOTE            Assessment/Plan for  Zelda Osman is a 72 y.o. female.  No Patient Care Coordination Note on file.       1.  Hyperlipidemia-on Rx check lipid liver.  Tolerating  2.  Coronary calcium score abnormality in the year 2000.  #3-low  3.  Osteopenia-improved with vitamin calcium estrogen exercise  4.  Situational anxiety- with dementia-discussion    Plan:  Check lipid liver on statin  Discussion      There are no diagnoses linked to this encounter.    Tunde Darling MD  Internal medicine  [facility]  [phone]  [email or web address]    This is an electronically verified report by Tunde Darling M.D.  (Note created with Dragon voice recognition and unintended spelling errors and word substitutions may occur)             Subjective:   Chief Complaint:  Hyperlipidemia (Fasting. Recent start simvastatin)    Tolerating statin  No myalgia or arthralgia    Has gone to support group at [place].  Very beneficial    Recent bone density did show improvement    Medications:  Current Outpatient Prescriptions on File Prior to Visit   Medication Sig     ESTRADIOL, BULK, MISC Use As Directed.     fexofenadine (ALLEGRA) 60 MG tablet Take 60 mg by mouth daily.     fluticasone (FLONASE) 50 mcg/actuation nasal spray 1 spray into each nostril as needed.      melatonin 5 mg Tab tablet Take 5 mg by mouth at bedtime as needed.     NON FORMULARY Take 0.5 tablets by mouth 4 (four) times a week. Optimox Iodoral 12.5mg tablet - Contains: Iodine 5 mg, Iodide (as potassium salt) 7.5 mg.     simvastatin (ZOCOR) 20 MG tablet Take 1 tablet (20 mg total) by mouth every evening.     UNABLE TO FIND Med Name: Bone support formula (calcium citrate 1000 mg., magesium glycinate 400, vitamin D3 6000, boron 3 mg. , vitamin K1 2 mg./ tsp.) sig 1/2 sp. BID     UNABLE TO FIND Med Name: Compounded bioidentical hormone cream,1.2 mg estradiol, 3 mg estriol, 50 mg progesterone, 0.7 mg  "testosterone.Sig: Apply 1 g topically daily.  From [facility]     ALPRAZolam (XANAX) 0.25 MG tablet Take 1-2 tablets (0.25-0.5 mg total) by mouth at bedtime as needed for sleep.     No current facility-administered medications on file prior to visit.      Allergies:  Allergies   Allergen Reactions     Dulera [Mometasone-Formoterol] Other (See Comments)     Weight loss, jittery, fatigue     House Dust        Review of Systems:     Extensive 10-point review of systems was performed. Please see the HPI for problem specific pertinent review of systems.     Patient does note       Otherwise, the following systems are noncontributory including constitutional, eyes, ears, nose and throat, cardiovascular, respiratory, gastrointestinal, genitourinary, musculoskeletal,neurological, skin and/or breast, endocrine, hematologic/lymph, allergic/immunologic and psychiatric.      Objective:    /64 (Patient Site: Left Arm, Patient Position: Sitting, Cuff Size: Adult Regular)  Pulse 62  Ht 5' 9\" (1.753 m)  Wt 141 lb 0.6 oz (64 kg)  LMP 06/30/1971 (Approximate)  SpO2 99%  Breastfeeding? No  BMI 20.83 kg/m2  Weight:   Wt Readings from Last 3 Encounters:   10/18/17 141 lb 0.6 oz (64 kg)   08/07/17 139 lb (63 kg)   04/14/17 137 lb 1.9 oz (62.2 kg)     [unfilled]  141 lb 0.6 oz (64 kg)    In general, no acute distress.  Discussion.  Reviewed bone density  Reviewed coronary calcium score        Review of clinical lab tests  Lab Results   Component Value Date    WBC 4.2 08/07/2017    HGB 14.7 08/07/2017    HCT 42.9 08/07/2017     08/07/2017    CHOL 253 (H) 08/07/2017    TRIG 83 08/07/2017    HDL 53 08/07/2017    ALT 11 08/07/2017    AST 15 08/07/2017     08/07/2017    K 4.1 08/07/2017     08/07/2017    CREATININE 1.00 08/07/2017    BUN 13 08/07/2017    CO2 25 08/07/2017    TSH 4.39 08/07/2017    INR 1.02 05/05/2013          Vitamin D, Total (25-Hydroxy)   Date Value Ref Range Status   08/07/2017 " 74.6 30.0 - 80.0 ng/mL Final         Glucose   Date/Time Value Ref Range Status   08/07/2017 09:12 AM 95 70 - 125 mg/dL Final   12/20/2014 05:25  (H) 70 - 125 mg/dL Final   05/05/2013 08:57  70 - 125 mg/dL Final     Comment:          Fasting Glucose reference range is 70-99 mg/dL per       American Diabetes Association (ADA) guidelines.   05/05/2013 02:40  70 - 125 mg/dL Final     Comment:          Fasting Glucose reference range is 70-99 mg/dL per       American Diabetes Association (ADA) guidelines.     No results found for this or any previous visit (from the past 24 hour(s)).    Dxa Bone Density Scan    Result Date: 10/10/2017  10/9/2017 RE: Zelda Osman YOB: 1945 Dear Tunde Darling, Patient Profile: 72 y.o. female, postmenopausal, is here for the follow up bone density test. History of fractures - None. Family history of osteoporosis - None.  Family history of hip fracture: None. Smoking history - No. Osteoporosis treatment past -  Yes;  Bisphosphonates. Osteoporosis treatment current - Yes;  HRT.  Chronic medical problems - Hysterectomy and Oophorectomy. High risk medications -  None. Assessment: 1. The spine bone density is best assessed at L1-L2 with T-score of -2.0. 2. Femoral bone densities show low bone mass with a left total hip T- score of -1.7, and right total hip T-score of -1.9. 3. Vertebral fracture assessment did not show any vertebral compression fractures.  The study appeared adequate for assessment from L4-T6. 4.  Since the scan in 2015, bone density has increased 4.8% at L1-L2.  Bone density has increased 2.3% in the left total hip, and 1.7% on the right.. 72 y.o. female with LOW BONE DENSITY (OSTEOPENIA) and MODERATE predicted fracture risk with a 10 year major osteoporotic fracture risk of 10.2 % and hip fracture risk of 2.2 %. Recommendations: Given the stability since last scan, a continuation of current conservative preventative measures is  reasonable.  A recheck in 2 years is advised. Bone densitometry was performed on your patient using our Qio iDXA densitometer. The results are summarized and a copy of the actual scans are included for your review. In conformity with the International Society of Clinical Densitometry's most recent position statement for DXA interpretation (2015), the diagnosis will be made on the lowest measured T-score of the lumbar spine, femoral neck, total proximal femur or 33% radius. Note the change in terminology for diagnostic classification from OSTEOPENIA to LOW BONE MASS. All trending for sequential exams will be done using multiple vertebrae or the total proximal femur. Fracture risk is based on the WHO Fracture Risk Assessment Tool (FRAX). If additional information is needed or if you would like to discuss the results, please do not hesitate to call me. Thank you for referring this patient to [facility]. We are happy to be of service in support of you and your practice. If you have any questions or suggestions to improve our service, please call me at [phone]. Sincerely, Ronn Cary M.D. [facility]. [facility], [facility]

## 2021-06-13 NOTE — PROGRESS NOTES
"Psychology Progress Note    Date: 12/14/2020    Time length and type of treatment: 4072-1558 , individual therapy, video visit    Necessity: This session is necessary to address features of anxiety and depression secondary to caregiver role for her  with Alzheimer's.  Patient was last seen 2/25/2020.  Secondary to this gap in service we did not update the patient's treatment plan in order to determine the patient's current goals and concerns.      After review of the patient's situation, this visit was changed from an in-person visit to a video visit via [employer] to reduce the risk of COVID 19 exposure.   Patient was informed that policies and procedures that govern in-person sessions would also apply to video sessions.  Patient was also informed that video sessions would be discontinued when COVID 19 exposure is no longer a concern (as determined by [facility]).      Patient distance location: home  Provider distance location: [facility] [place]/[facility]    Patient was in agreement with proceeding with a video session.    Intervention: Patient reports, \"I just don't know how I can handle one more thing.\"  She describes significant stressors for the past 10 months regarding living during the pandemic and finding competent care for her .  She indicates that she had to move her  out of the [facility] in which he was residing due to the extremely poor care.  She moved him to a group home in October.  Patient reports just learning yesterday that her  tested positive for COVID.  She last saw him in person on Wednesday.  She describes trying this morning to find somewhere that she can get tested.  She expresses worry that she may have exposed her brother.    We discuss how the patient has taken every safety measure that she can;  We discuss how the patient engages in a variety of activities to manage her anxiety and depression (meditation, exercise, " "contact with friends and family).    Patient reports that she is very satisfied with the care that her  is now receiving.  This writer validates that the patient is doing everything that she can.  \"I was hoping that you might have a quick fix.\"  Patient expresses worry that something will happen to her leaving her  alone and without support.  We discuss \"one day at a time approach\" and not getting too far ahead of her self.    This writer utilized motivational interviewing, active listening, reassurance and support in the context of cognitive behavioral therapy and ACT therapy to address the above.      Mental Status: Orientation to person, place, and time is in tact.  Recent and remote memory, attention, concentration, language, and fund of knowledge are intact.  Mood appears depressed with tearful affect.  No indication of suicidal ideation, plan, or intent.  No indication of homicidal ideation, plan or intent.    Progress: Patient reports feeling excessively stressed.  Progress is good with maintaining stable mood and adaptive coping with extreme circumstances and loss.    Plan: Patient declined scheduling future sessions at this time.  She reports that the validation that she received today met her needs.  She was advised on how to initiate services should the need arise in the future.     Diagnosis:  Adjustment disorder with depressed and anxious mood; consider major depressive disorder with anxious distress.  "

## 2021-06-18 NOTE — PROGRESS NOTES
Psychology Progress Note    Date: 6/1/2018    Duration: 5796-2693    Necessity: This session is necessary to address exacerbated anxiety and depression in the context of her caregiver role with her  with Alzheimer's.  We initiate patient's treatment plan today.  She rates her anxiety on the KAREL-7 anxiety severity scale as 10 indicating moderate anxiety.  Patient rates her depression on the PHQ-9 depression severity scale as 8 indicating moderate depression.  Today we focus on the patient's anxiety and depression treatment plan, specifically exploring sleep hygiene and cognitive messages that exacerbate anxiety and depression.    Intervention: Patient reports wanting to focus today on difficulty with sleep.  The patient was last seen in August, 2017.  At that time the patient had also reported sleep issues.  She indicates thatsleep may be worse since that time.  She describes dealing with a cough for 4 months that disrupted her sleep.  She also reports dealing with her 's health issues including sleep apnea and severe arthritis.  Her  did eventually undergo shoulder surgery which at the present time requires her to assist her  2 times during the night.    This writer reviews with the patient that sleep issues are not my area of expertise.  I offered to review some basics of sleep hygiene.  Patient reports that she would like to proceed with that.  We discuss getting out of bed and leaving the bedroom after 20 or 30 minutes if she is not falling asleep.  We discussed the kinds of activities that she might engage in such as listening to music, reading pleasant books, engaging in crafts or art.  We discussed that after she returns to bed when she is feeling tired that if she is not falling asleep after 20 or 30 minutes that she needs to get up again.  We also discussed the strategy of not going to sleep until 5 AM which is currently a time when she typically is able to sleep and over time  "back into a normal night's sleep.  We also explored her cognitive ideation about sleep.  We worked on reframing some of these statements such as instead of saying \"nothing is working\" to focus on all these steps help me restore my sleep.  We discussed the importance of using her bed and bedroom for sleep only.  We also discussed that if she takes a nap during the day to limit it to 20 minutes if possible.    This writer utilized motivational interviewing, active listening, reassurance and support in the context of cognitive behavioral therapy and ACT therapy to address the above.      Mental Status: Orientation to person, place, and time is in tact.  Recent and remote memory, attention, concentration, language, and fund of knowledge are in tact.  Mood appears stable with sad affect.  No indication of suicidal ideation, plan, or intent.  No indication of homicidal ideation, plan or intent.    Progress: Patient reports frustration with sleep difficulty.  Progress is good with adjusting to caregiver role.    Plan: Patient will return in one month where we will continue with cognitive behavioral therapy to address sleep issues.  We discussed that if trying these basic strategies were not effective that the patient might consider referral to a psychologist who has expertise in the area of sleep.  Patient is in agreement with this plan.    Diagnosis: Adjustment disorder with depressed and anxious mood  "

## 2021-06-19 NOTE — PROGRESS NOTES
Psychology Progress Note    Date: 7/2/2018    Duration: 6227-3538    Necessity: This session is necessary to address increased anxiety and insomnia.  Today we focus on the patient's anxiety treatment plan, specifically exploring cognitive messages that exacerbate anxiety, discussing sleep hygiene, and learning and practicing relaxation techniques.    Intervention: Patient reports doing better.  She indicates that her sleep is trending in a positive direction.  She reports sleeping 7 hours 2 or 3 nights in the past week or 10 days.  She reports several days with 6 hours of sleep.  She reports no days less than 5 hours.  She describes her discovery of her catastrophic thinking and how it was contributing to sleep difficulty.  We explore ways of intervening with catastrophic thinking and devising a new script.  We also discuss the patient's experience of having a catastrophic thing happen e.g. her 's diagnosis of Alzheimer's and the grief process that goes with that.  We discuss how that might impact her pattern of thinking to be more negative than is typical for her.  We review strategies for engaging in more adaptive self talk.    This writer utilized motivational interviewing, active listening, reassurance and support in the context of cognitive behavioral therapy and ACT therapy to address the above.      Mental Status: Orientation to person, place, and time is in tact.  Recent and remote memory, attention, concentration, language, and fund of knowledge are in tact.  Mood appears stable with pleasant affect.  No indication of suicidal ideation, plan, or intent.  No indication of homicidal ideation, plan or intent.    Progress: Patient feels she is sleeping better and is pleased with her progress.  Progress is good with becoming aware of her catastrophic thinking.    Plan: We mutually agreed that further's visits are not warranted at this time.  Patient was advised on how to initiate services should the need  arise in the future.    Diagnosis: Adjustment disorder with anxious mood

## 2021-06-24 NOTE — TELEPHONE ENCOUNTER
RN cannot approve Refill Request    RN can NOT refill this medication Protocol failed and NO refill given. Last office visit: 10/18/2017 Tunde Darling MD Last Physical: 8/7/2017 Last MTM visit: Visit date not found Last visit same specialty: 10/18/2017 Tunde Darling MD.  Next visit within 3 mo: Visit date not found  Next physical within 3 mo: Visit date not found      Chanel Perez, [facility] Triage/Med Refill 2/12/2019    Requested Prescriptions   Pending Prescriptions Disp Refills     simvastatin (ZOCOR) 20 MG tablet [Pharmacy Med Name: SIMVASTATIN 20 MG TABLET] 90 tablet 0     Sig: TAKE 1 TABLET BY MOUTH EVERY DAY IN THE EVENING    Statins Refill Protocol (Hmg CoA Reductase Inhibitors) Failed - 2/9/2019  7:01 PM       Failed - PCP or prescribing provider visit in past 12 months     Last office visit with prescriber/PCP: 10/18/2017 Tunde Darling MD OR same dept: Visit date not found OR same specialty: 10/18/2017 Tunde Darling MD  Last physical: 8/7/2017 Last MTM visit: Visit date not found   Next visit within 3 mo: Visit date not found  Next physical within 3 mo: Visit date not found  Prescriber OR PCP: Tunde Darling MD  Last diagnosis associated with med order: 1. Hyperlipidemia LDL goal <100  - simvastatin (ZOCOR) 20 MG tablet [Pharmacy Med Name: SIMVASTATIN 20 MG TABLET]; TAKE 1 TABLET BY MOUTH EVERY DAY IN THE EVENING  Dispense: 90 tablet; Refill: 0    If protocol passes may refill for 12 months if within 3 months of last provider visit (or a total of 15 months).

## 2021-07-03 NOTE — ADDENDUM NOTE
Addendum Note by Allison Treviño Psy.D, LP at 8/29/2017 11:59 PM     Author: Allison Treviño Psy.D, LP Service: -- Author Type: Psychologist    Filed: 8/31/2017 12:56 PM Date of Service: 8/29/2017 11:59 PM Status: Signed    : Allison Treviño Psy.D, LP (Psychologist)    Encounter addended by: Allison Treviño Psy.D, LP on: 8/31/2017 12:56 PM<BR>     Actions taken: Sign clinical note

## 2021-08-03 ENCOUNTER — TRANSFERRED RECORDS (OUTPATIENT)
Dept: HEALTH INFORMATION MANAGEMENT | Facility: CLINIC | Age: 76
End: 2021-08-03

## 2021-08-04 ENCOUNTER — MYC MEDICAL ADVICE (OUTPATIENT)
Dept: INTERNAL MEDICINE | Facility: CLINIC | Age: 76
End: 2021-08-04

## 2021-08-09 ENCOUNTER — TRANSFERRED RECORDS (OUTPATIENT)
Dept: HEALTH INFORMATION MANAGEMENT | Facility: CLINIC | Age: 76
End: 2021-08-09

## 2021-08-09 LAB
NEGATIVE: NORMAL
NEGATIVE: NORMAL

## 2021-08-15 DIAGNOSIS — F41.9 ANXIETY: Primary | ICD-10-CM

## 2021-08-16 RX ORDER — LORAZEPAM 0.5 MG/1
TABLET ORAL
Qty: 20 TABLET | Refills: 0 | Status: SHIPPED | OUTPATIENT
Start: 2021-08-16 | End: 2021-08-23

## 2021-08-16 NOTE — TELEPHONE ENCOUNTER
Patient Requested  LORazepam (ATIVAN) 0.5 MG tablet  Last Filled  11/24/2020  Last Office Visit  11/04/2020  Next Office Visit     Checked  08/16/2021    DX:     Pharmacy:     ROSA Malagon RN at 7:04 AM on 8/16/2021.

## 2021-08-21 DIAGNOSIS — F41.9 ANXIETY: ICD-10-CM

## 2021-08-23 RX ORDER — LORAZEPAM 0.5 MG/1
TABLET ORAL
Qty: 20 TABLET | Refills: 0 | Status: SHIPPED | OUTPATIENT
Start: 2021-08-23 | End: 2022-06-15

## 2021-08-23 NOTE — TELEPHONE ENCOUNTER
Patient Requested  LORazepam (ATIVAN) 0.5 MG tablet  Last Filled  08/16/2021  Last Office Visit  11/04/2020  Next Office Visit     Checked  08/23/2021    DX:     Pharmacy:     ROSA Malagon RN at 7:04 AM on 8/16/2021.

## 2021-09-11 ENCOUNTER — HEALTH MAINTENANCE LETTER (OUTPATIENT)
Age: 76
End: 2021-09-11

## 2021-09-11 ENCOUNTER — MYC MEDICAL ADVICE (OUTPATIENT)
Dept: INTERNAL MEDICINE | Facility: CLINIC | Age: 76
End: 2021-09-11

## 2021-09-13 ENCOUNTER — MYC MEDICAL ADVICE (OUTPATIENT)
Dept: INTERNAL MEDICINE | Facility: CLINIC | Age: 76
End: 2021-09-13

## 2021-09-13 DIAGNOSIS — F51.02 ADJUSTMENT INSOMNIA: ICD-10-CM

## 2021-09-13 DIAGNOSIS — F41.9 ANXIETY: Primary | ICD-10-CM

## 2021-09-14 RX ORDER — SERTRALINE HYDROCHLORIDE 25 MG/1
25 TABLET, FILM COATED ORAL DAILY
Qty: 60 TABLET | Refills: 4 | Status: SHIPPED | OUTPATIENT
Start: 2021-09-14 | End: 2021-11-09

## 2021-09-19 ENCOUNTER — MYC MEDICAL ADVICE (OUTPATIENT)
Dept: INTERNAL MEDICINE | Facility: CLINIC | Age: 76
End: 2021-09-19

## 2021-09-22 ENCOUNTER — MYC MEDICAL ADVICE (OUTPATIENT)
Dept: INTERNAL MEDICINE | Facility: CLINIC | Age: 76
End: 2021-09-22

## 2021-09-22 DIAGNOSIS — F41.9 ANXIETY: Primary | ICD-10-CM

## 2021-09-23 ENCOUNTER — MYC MEDICAL ADVICE (OUTPATIENT)
Dept: INTERNAL MEDICINE | Facility: CLINIC | Age: 76
End: 2021-09-23

## 2021-09-24 RX ORDER — CITALOPRAM HYDROBROMIDE 10 MG/1
10 TABLET ORAL DAILY
Qty: 60 TABLET | Refills: 4 | Status: SHIPPED | OUTPATIENT
Start: 2021-09-24 | End: 2022-05-16

## 2021-11-06 DIAGNOSIS — F41.9 ANXIETY: ICD-10-CM

## 2021-11-06 DIAGNOSIS — F51.02 ADJUSTMENT INSOMNIA: ICD-10-CM

## 2021-11-09 ENCOUNTER — MYC MEDICAL ADVICE (OUTPATIENT)
Dept: INTERNAL MEDICINE | Facility: CLINIC | Age: 76
End: 2021-11-09
Payer: COMMERCIAL

## 2021-11-09 RX ORDER — SERTRALINE HYDROCHLORIDE 25 MG/1
25 TABLET, FILM COATED ORAL DAILY
Qty: 30 TABLET | Refills: 0 | Status: SHIPPED | OUTPATIENT
Start: 2021-11-09 | End: 2021-12-21

## 2021-11-09 NOTE — TELEPHONE ENCOUNTER
Left message with Primary Care clinic number requesting patient to call back to schedule annual physical appointment with PCP as Last Office Visit :  11/4/2020

## 2021-11-09 NOTE — TELEPHONE ENCOUNTER
SERTRALINE HCL 25 MG TABLET  Last Written Prescription Date:  9/14/2021  Last Fill Quantity: 60,   # refills: 4  Last Office Visit :  11/4/2020  Future Office visit:  None    Routing refill request to provider for review/approval because:  Second Request   Over due office visit  Refer to clinic for review       Sis Vasquez RN  Central Triage Red Flags/Med Refills

## 2021-11-16 DIAGNOSIS — F41.9 ANXIETY: ICD-10-CM

## 2021-11-17 RX ORDER — CITALOPRAM HYDROBROMIDE 10 MG/1
TABLET ORAL
Qty: 90 TABLET | Refills: 3 | OUTPATIENT
Start: 2021-11-17

## 2021-11-29 DIAGNOSIS — E78.5 HYPERLIPIDEMIA LDL GOAL <100: ICD-10-CM

## 2021-12-01 RX ORDER — ATORVASTATIN CALCIUM 20 MG/1
20 TABLET, FILM COATED ORAL DAILY
Qty: 90 TABLET | Refills: 0 | Status: SHIPPED | OUTPATIENT
Start: 2021-12-01 | End: 2021-12-21

## 2021-12-01 NOTE — TELEPHONE ENCOUNTER
ATORVASTATIN 20 MG TABLET  Last Written Prescription Date:  12/4/2020  Last Fill Quantity: 100,   # refills: 3  Last Office Visit :  11/4/2020  Future Office visit:  12/21/2021  90 Tabs sent to pharm   12/1/2021      Sis Vasquez RN  Central Triage Red Flags/Med Refills

## 2021-12-18 ASSESSMENT — ENCOUNTER SYMPTOMS
FEVER: 0
INSOMNIA: 1
FATIGUE: 1
POLYPHAGIA: 0
DECREASED CONCENTRATION: 1
INCREASED ENERGY: 1
NERVOUS/ANXIOUS: 1
CHILLS: 0
PANIC: 0
ALTERED TEMPERATURE REGULATION: 0
DECREASED APPETITE: 0
WEIGHT GAIN: 0
HALLUCINATIONS: 0
POLYDIPSIA: 0
WEIGHT LOSS: 0
NIGHT SWEATS: 0

## 2021-12-18 ASSESSMENT — ACTIVITIES OF DAILY LIVING (ADL)
IN_THE_PAST_7_DAYS,_DID_YOU_NEED_HELP_FROM_OTHERS_TO_PERFORM_EVERYDAY_ACTIVITIES_SUCH_AS_EATING,_GETTING_DRESSED,_GROOMING,_BATHING,_WALKING,_OR_USING_THE_TOILET: N
IN_THE_PAST_7_DAYS,_DID_YOU_NEED_HELP_FROM_OTHERS_TO_TAKE_CARE_OF_THINGS_SUCH_AS_LAUNDRY_AND_HOUSEKEEPING,_BANKING,_SHOPPING,_USING_THE_TELEPHONE,_FOOD_PREPARATION,_TRANSPORTATION,_OR_TAKING_YOUR_OWN_MEDICATIONS?: N

## 2021-12-21 ENCOUNTER — OFFICE VISIT (OUTPATIENT)
Dept: INTERNAL MEDICINE | Facility: CLINIC | Age: 76
End: 2021-12-21
Payer: COMMERCIAL

## 2021-12-21 ENCOUNTER — LAB (OUTPATIENT)
Dept: LAB | Facility: CLINIC | Age: 76
End: 2021-12-21
Payer: COMMERCIAL

## 2021-12-21 VITALS
SYSTOLIC BLOOD PRESSURE: 111 MMHG | OXYGEN SATURATION: 98 % | BODY MASS INDEX: 19.85 KG/M2 | HEART RATE: 72 BPM | DIASTOLIC BLOOD PRESSURE: 72 MMHG | HEIGHT: 69 IN | WEIGHT: 134 LBS

## 2021-12-21 DIAGNOSIS — M85.89 OSTEOPENIA OF MULTIPLE SITES: Primary | ICD-10-CM

## 2021-12-21 DIAGNOSIS — E78.5 HYPERLIPIDEMIA LDL GOAL <100: ICD-10-CM

## 2021-12-21 DIAGNOSIS — M85.89 OSTEOPENIA OF MULTIPLE SITES: ICD-10-CM

## 2021-12-21 DIAGNOSIS — F51.02 ADJUSTMENT INSOMNIA: ICD-10-CM

## 2021-12-21 LAB
ALBUMIN SERPL-MCNC: 3.6 G/DL (ref 3.4–5)
ALP SERPL-CCNC: 50 U/L (ref 40–150)
ALT SERPL W P-5'-P-CCNC: 30 U/L (ref 0–50)
ANION GAP SERPL CALCULATED.3IONS-SCNC: 6 MMOL/L (ref 3–14)
AST SERPL W P-5'-P-CCNC: 18 U/L (ref 0–45)
BILIRUB SERPL-MCNC: 0.6 MG/DL (ref 0.2–1.3)
BUN SERPL-MCNC: 17 MG/DL (ref 7–30)
CALCIUM SERPL-MCNC: 9.6 MG/DL (ref 8.5–10.1)
CHLORIDE BLD-SCNC: 106 MMOL/L (ref 94–109)
CO2 SERPL-SCNC: 28 MMOL/L (ref 20–32)
CREAT SERPL-MCNC: 0.87 MG/DL (ref 0.52–1.04)
GFR SERPL CREATININE-BSD FRML MDRD: 68 ML/MIN/1.73M2
GLUCOSE BLD-MCNC: 87 MG/DL (ref 70–99)
POTASSIUM BLD-SCNC: 4.2 MMOL/L (ref 3.4–5.3)
PROT SERPL-MCNC: 6.9 G/DL (ref 6.8–8.8)
SODIUM SERPL-SCNC: 140 MMOL/L (ref 133–144)
TSH SERPL DL<=0.005 MIU/L-ACNC: 2.88 MU/L (ref 0.4–4)

## 2021-12-21 PROCEDURE — 36415 COLL VENOUS BLD VENIPUNCTURE: CPT | Performed by: PATHOLOGY

## 2021-12-21 PROCEDURE — G0439 PPPS, SUBSEQ VISIT: HCPCS | Performed by: INTERNAL MEDICINE

## 2021-12-21 PROCEDURE — 84443 ASSAY THYROID STIM HORMONE: CPT | Performed by: PATHOLOGY

## 2021-12-21 PROCEDURE — 80053 COMPREHEN METABOLIC PANEL: CPT | Performed by: PATHOLOGY

## 2021-12-21 PROCEDURE — 80061 LIPID PANEL: CPT | Performed by: INTERNAL MEDICINE

## 2021-12-21 RX ORDER — ATORVASTATIN CALCIUM 20 MG/1
20 TABLET, FILM COATED ORAL DAILY
Qty: 90 TABLET | Refills: 3 | Status: SHIPPED | OUTPATIENT
Start: 2021-12-21 | End: 2023-02-03

## 2021-12-21 RX ORDER — TRAZODONE HYDROCHLORIDE 50 MG/1
25 TABLET, FILM COATED ORAL
Qty: 60 TABLET | Refills: 1 | Status: SHIPPED | OUTPATIENT
Start: 2021-12-21 | End: 2022-06-21

## 2021-12-21 ASSESSMENT — PAIN SCALES - GENERAL: PAINLEVEL: NO PAIN (0)

## 2021-12-21 ASSESSMENT — MIFFLIN-ST. JEOR: SCORE: 1152.45

## 2021-12-21 NOTE — PROGRESS NOTES
HPI  76-year-old presents today for wellness visit.  She lost her  4 weeks ago from Alzheimer's disease.  She is still struggling to deal with this and is tearful and discussing it.  She has responded by increasing her walking and her physical activity.  Been exercising regularly but still sleeping somewhat poorly.  She is only had intermittent partial response from taking the trazodone at bedtime.  We discussed trying to use this regularly for a couple of weeks.  She has found that the citalopram has been helpful in regards to the depressive symptoms in we talked about continuing this for another 5 months.  Otherwise she has been feeling well and has no other complaints or problems today.  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     Subclinical hypothyroidism      Past Surgical History:   Procedure Laterality Date     BILATERAL SALPINGOOPHORECTOMY       bowel obstruction due to adhesions       C APPENDECTOMY      Description: Appendectomy;  Recorded: 09/25/2011;     C REMOVAL OF OVARY(S)      Description: Oophorectomy;  Recorded: 09/25/2011;     HC REMOVAL OF TONSILS,<13 Y/O      Description: Tonsillectomy;  Recorded: 08/18/2011;     HYSTERECTOMY       HYSTERECTOMY       OOPHORECTOMY Bilateral      Family History   Problem Relation Age of Onset     Cardiomyopathy Father      Prostate Cancer Father      Answers for HPI/ROS submitted by the patient on 12/18/2021  General Symptoms: Yes  Skin Symptoms: No  HENT Symptoms: No  EYE SYMPTOMS: No  HEART SYMPTOMS: No  LUNG  "SYMPTOMS: No  INTESTINAL SYMPTOMS: No  URINARY SYMPTOMS: No  GYNECOLOGIC SYMPTOMS: No  BREAST SYMPTOMS: No  SKELETAL SYMPTOMS: No  BLOOD SYMPTOMS: No  NERVOUS SYSTEM SYMPTOMS: No  MENTAL HEALTH SYMPTOMS: Yes  Fever: No  Loss of appetite: No  Weight loss: No  Weight gain: No  Fatigue: Yes  Night sweats: No  Chills: No  Increased stress: Yes  Excessive hunger: No  Excessive thirst: No  Feeling hot or cold when others believe the temperature is normal: No  Loss of height: No  Post-operative complications: No  Surgical site pain: No  Hallucinations: No  Change in or Loss of Energy: Yes  Hyperactivity: No  Confusion: No  Nervous or Anxious: Yes  Trouble sleeping: Yes  Trouble thinking or concentrating: Yes  Mood changes: Yes  Panic attacks: No        Exam:  /72 (BP Location: Right arm, Patient Position: Sitting, Cuff Size: Adult Regular)   Pulse 72   Ht 1.745 m (5' 8.7\")   Wt 60.8 kg (134 lb)   SpO2 98%   BMI 19.96 kg/m    134 lbs 0 oz  PHYSICAL EXAMINATION:   The patient is alert, oriented with a clear sensorium.   Skin shows no lesions or rashes and good turgor.   Head is normocephalic and atraumatic.   Eyes show PERRLA. Fundi are benign.   Ears show normal TMs bilaterally.   Mouth shows clear oral mucosa.   Neck shows no nodes, thyromegaly or bruits.   Back is nontender.   Lungs are clear to percussion and auscultation.   Heart shows normal S1 and S2 without murmur or gallop.   Abdomen is soft, nontender without masses or organomegaly.   Extremities show no edema and no evidence of active synovitis.   Neurologic examination shows cranial nerves II-XII intact. Motor shows normal strength. Reflexes are full and symmetrical.       ASSESSMENT  1 hyperlipidemia on atorvastatin  2 osteoporosis on supplemental calcium and vitamin D FRAX 11% major fracture risk will recheck DEXA  3 pancreatic cysts ? Small IPMN needs follow-up in 2022  4 benign gallbladder polyp  5 Subclinical hypothyroidism  7 Hypoalbuminemia  8 " Stress and anxiety related to husbands death  9 insomnia secondary to above    Plan  As above we will try her on 25 mg of trazodone for a couple of weeks.  We will reassess her labs today recheck her DEXA scan.  Plan to repeat her MRI for the possible IPMN's next year.  This note was completed using Dragon voice recognition software.      Tunde Painter MD  General Internal Medicine  [facility]  [phone]

## 2021-12-21 NOTE — NURSING NOTE
Chief Complaint   Patient presents with     Wellness Visit     general check in, believes she is having gi issues due to stress (lost  4 weeks ago)       Ana Napoles, EMT at 10:47 AM on 12/21/2021

## 2021-12-23 LAB
CHOLEST SERPL-MCNC: 142 MG/DL
FASTING STATUS PATIENT QL REPORTED: YES
HDLC SERPL-MCNC: 59 MG/DL
LDLC SERPL CALC-MCNC: 65 MG/DL
NONHDLC SERPL-MCNC: 83 MG/DL
TRIGL SERPL-MCNC: 92 MG/DL

## 2021-12-27 ENCOUNTER — ANCILLARY PROCEDURE (OUTPATIENT)
Dept: BONE DENSITY | Facility: CLINIC | Age: 76
End: 2021-12-27
Attending: INTERNAL MEDICINE
Payer: COMMERCIAL

## 2021-12-27 DIAGNOSIS — M85.89 OSTEOPENIA OF MULTIPLE SITES: ICD-10-CM

## 2021-12-27 PROCEDURE — 77080 DXA BONE DENSITY AXIAL: CPT | Performed by: INTERNAL MEDICINE

## 2022-05-14 DIAGNOSIS — F41.9 ANXIETY: ICD-10-CM

## 2022-05-16 RX ORDER — CITALOPRAM HYDROBROMIDE 10 MG/1
10 TABLET ORAL DAILY
Qty: 30 TABLET | Refills: 7 | Status: SHIPPED | OUTPATIENT
Start: 2022-05-16 | End: 2022-06-20

## 2022-06-15 ENCOUNTER — OFFICE VISIT (OUTPATIENT)
Dept: INTERNAL MEDICINE | Facility: CLINIC | Age: 77
End: 2022-06-15
Payer: COMMERCIAL

## 2022-06-15 ENCOUNTER — ANCILLARY PROCEDURE (OUTPATIENT)
Dept: GENERAL RADIOLOGY | Facility: CLINIC | Age: 77
End: 2022-06-15
Attending: INTERNAL MEDICINE
Payer: COMMERCIAL

## 2022-06-15 VITALS
HEART RATE: 74 BPM | WEIGHT: 141 LBS | DIASTOLIC BLOOD PRESSURE: 71 MMHG | BODY MASS INDEX: 21 KG/M2 | SYSTOLIC BLOOD PRESSURE: 119 MMHG | OXYGEN SATURATION: 94 %

## 2022-06-15 DIAGNOSIS — D49.0 IPMN (INTRADUCTAL PAPILLARY MUCINOUS NEOPLASM): Primary | ICD-10-CM

## 2022-06-15 DIAGNOSIS — M54.12 CERVICAL RADICULOPATHY: ICD-10-CM

## 2022-06-15 PROCEDURE — 99214 OFFICE O/P EST MOD 30 MIN: CPT | Performed by: INTERNAL MEDICINE

## 2022-06-15 PROCEDURE — 72040 X-RAY EXAM NECK SPINE 2-3 VW: CPT | Performed by: RADIOLOGY

## 2022-06-15 RX ORDER — PREDNISONE 20 MG/1
20 TABLET ORAL DAILY
Qty: 10 TABLET | Refills: 0 | Status: SHIPPED | OUTPATIENT
Start: 2022-06-15 | End: 2023-05-10

## 2022-06-15 ASSESSMENT — PAIN SCALES - GENERAL: PAINLEVEL: SEVERE PAIN (7)

## 2022-06-15 NOTE — NURSING NOTE
Chief Complaint   Patient presents with     Pain     Pt here for right shoulder and neck pain since January. Mostly when moving       Jaimie Torres CMA, EMT at 8:30 AM on 6/15/2022.

## 2022-06-15 NOTE — PROGRESS NOTES
HPI  76-year-old presents today with a 5-month history of pain in the right neck and shoulder.  There is no preceding injury or trauma other than she thinks she might have sleeping wrong on it.  She has had discomfort in and around the upper trapezius on the right as well as the lower neck.  This has not been associated with any pain radiating into the arm at all.  It does seem to be aggravated by movement predominantly on the left particularly rotation and extension.  No numbness or tingling in association with this.  No red hot or swollen joints elsewhere.  In the past she has had a lumbar radiculopathy but no cervical issues.  She is also due to follow-up on a pancreatic cyst we will set her up for an MRI.  She still gets tearful discussing her 's death but is managing to except and deal with this better.  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     Subclinical hypothyroidism      Past Surgical History:   Procedure Laterality Date     BILATERAL SALPINGOOPHORECTOMY       bowel obstruction due to adhesions       HC REMOVAL OF TONSILS,<11 Y/O      Description: Tonsillectomy;  Recorded: 08/18/2011;     HYSTERECTOMY       HYSTERECTOMY       OOPHORECTOMY Bilateral      ZZC APPENDECTOMY      Description: Appendectomy;  Recorded: 09/25/2011;     ZZC REMOVAL OF OVARY(S)      Description: Oophorectomy;  Recorded: 09/25/2011;     Family History   Problem Relation Age of Onset     Cardiomyopathy Father      Prostate Cancer Father          Exam:  /71 (BP Location: Right  arm, Patient Position: Sitting, Cuff Size: Adult Regular)   Pulse 74   Wt 64 kg (141 lb)   SpO2 94%   BMI 21.00 kg/m    141 lbs 0 oz  Alert oriented with a clear sensorium.  Neck shows decreased rotation bilaterally with pain with right-sided rotation positive Spurling's test on the right.  Extremities show full range of motion of the shoulder bilaterally good strength in the supraspinatus in internal and external rotation negative impingement signs.  Neurologic shows full and symmetrical strength and reflexes in the upper extremities.    ASSESSMENT  1 cervical degenerative disc disease with radiculopathy  2 osteoporosis on supplemental calcium and vitamin D   3 pancreatic cysts ? Small IPMN needs follow-up   4 benign gallbladder polyp  5 Subclinical hypothyroidism  7 Hypoalbuminemia  8 Stress related to husbands death  9 hyperlipidemia on atorvastatin    Plan  Give her a 10-day course of prednisone 20 mg daily refer to physical therapy and x-ray her neck today.  Have her follow-up with progress report in a month.  Will also get an MRI to follow-up on her pancreatic cyst  This note was completed using Dragon voice recognition software.      Tunde Painter MD  General Internal Medicine  [facility]  [phone]

## 2022-06-18 DIAGNOSIS — F51.02 ADJUSTMENT INSOMNIA: ICD-10-CM

## 2022-06-21 RX ORDER — TRAZODONE HYDROCHLORIDE 50 MG/1
25 TABLET, FILM COATED ORAL
Qty: 45 TABLET | Refills: 2 | Status: SHIPPED | OUTPATIENT
Start: 2022-06-21 | End: 2023-05-10

## 2022-06-24 ENCOUNTER — THERAPY VISIT (OUTPATIENT)
Dept: PHYSICAL THERAPY | Facility: CLINIC | Age: 77
End: 2022-06-24
Attending: INTERNAL MEDICINE
Payer: COMMERCIAL

## 2022-06-24 DIAGNOSIS — M54.12 CERVICAL RADICULOPATHY: ICD-10-CM

## 2022-06-24 DIAGNOSIS — M54.2 NECK PAIN: Primary | ICD-10-CM

## 2022-06-24 PROCEDURE — 97161 PT EVAL LOW COMPLEX 20 MIN: CPT | Mod: GP

## 2022-06-24 PROCEDURE — 97110 THERAPEUTIC EXERCISES: CPT | Mod: GP

## 2022-06-24 NOTE — PROGRESS NOTES
"Physical Therapy Initial Examination/Evaluation  June 24, 2022    Zelda Osman is a 76 year old female referred to physical therapy by Tunde Painter MD for treatment of Cervical radiculopathy with Precautions/Restrictions/MD instructions Eval and treat.     Therapist Impression:   Patient has complaints of R shoulder / neck pain that has been ongoing for 6+ months. She started to sleep with her head propped up due to her 's illness, but lowered it back after he passed. She currently sleeps with two pillows and has most stiffness in the morning when she wakes up Patient found to have impaired seated posture, poor deep neck flexor endurance and impaired cervical ROM. Patient given HEP with cervical rotation, chin tucks, scap retraction and education on seated and sleeping posture.     Subjective:  DOI/onset: 1/13/2022  Acute Injury or Gradual Onset?: Gradual injury over time  Mechanism of Injury: propped head of bed up   Related PMH: None    Imaging: x-ray  Chief Complaint/Functional Limitations:  R neck / shoulder pain and see below in therapy evaluation codes   Pain: rest 0 /10, activity 4/10 Location: R UT Frequency: Intermittent Described as: aching Previous Treatment: pregnisone Effect of prior treatment: good Alleviated by: pregnisone Progression of Symptoms: Gradually getting better. Time of day when pain is worse: Morning  Sleeping: Losing 2 hours of sleep per night   Occupation: retired  Job duties: driving, Yard work, Cleaning  Current HEP/exercise regimen: snowshoeing, cross country skiing, light weight lifting, biking 20 miles, walking/hiking   Patient's goals are see chief complaints \"To sleep and not have neck pain\"     Other pertinent PMH/Red Flags: depression, osteoporosis, ovarian cancer (1990s)  Barriers at home/work: Live alone  Pertinent Surgical History: Cancer - ovarian   Medications: hormone replacement therapy, statin  General health as reported by patient: " excellent  Return to MD:  PRN    Cervical Spine Evaluation    Posture  Forward Head: Mod  Rounded Shoulders: mod    Range of Motion  Flexion: min loss, pain  Extension: WNL, pain-free  Sidebend Left: 15 pain on R   Right: 30 pain on L  Rotation Left: 50, pain on %  Right: 55, pain on L    Upper extremity screen: WNL    Cervical Strength   Deep neck flexors: 4/60 sec    R handed     Upper Extremity Strength  Shoulder MMT Flexion Abduction ER IR   Left 5/5 5/5 5/5 5/5   Right 5/5 5/5 5/5 5/5       Palpation  Moderate tenderness to palpation at JONI UT, suboccipitals     Assessment/Plan:  Patient is a 76 year old female with cervical and right side shoulder complaints.    Patient has the following significant findings with corresponding treatment plan.                Diagnosis 1:  Neck pain, R shoulder pain  Pain -  manual therapy and directional preference exercise  Decreased ROM/flexibility - manual therapy and therapeutic exercise  Decreased strength - therapeutic exercise and therapeutic activities  Impaired muscle performance - neuro re-education  Decreased function - therapeutic activities  Impaired posture - neuro re-education    Therapy Evaluation Codes:   1) History comprised of:   Personal factors that impact the plan of care:      Time since onset of symptoms.    Comorbidity factors that impact the plan of care are:      depression, osteoporosis, ovarian cancer .     Medications impacting care: hormone replacement therapy, statin.  2) Examination of Body Systems comprised of:   Body structures and functions that impact the plan of care:      Cervical spine and Shoulder.   Activity limitations that impact the plan of care are:      Bathing, Driving, Lifting, Sitting, Sleeping and Laying down.  3) Clinical presentation characteristics are:   Stable/Uncomplicated.  4) Decision-Making    Low complexity using standardized patient assessment instrument and/or measureable assessment of functional  outcome.  Cumulative Therapy Evaluation is: Low complexity.    Previous and current functional limitations:  (See Goal Flow Sheet for this information)    Short term and Long term goals: (See Goal Flow Sheet for this information)     Communication ability:  Patient appears to be able to clearly communicate and understand verbal and written communication and follow directions correctly.  Treatment Explanation - The following has been discussed with the patient:   RX ordered/plan of care  Anticipated outcomes  Possible risks and side effects  This patient would benefit from PT intervention to resume normal activities.   Rehab potential is good.    Frequency:  1 X week, once daily  Duration:  x4 weeks, tapering to 2x/month x1 month  Discharge Plan:  Achieve all LTG.  Independent in home treatment program.  Reach maximal therapeutic benefit.    Please refer to the daily flowsheet for treatment today, total treatment time and time spent performing 1:1 timed codes.     .

## 2022-06-24 NOTE — PROGRESS NOTES
[facility]    OUTPATIENT Physical Therapy ORTHOPEDIC EVALUATION  PLAN OF TREATMENT FOR OUTPATIENT REHABILITATION  (COMPLETE FOR INITIAL CLAIMS ONLY)  Patient's Last Name, First Name, M.I.  YOB: 1945  Zelda Osman    Provider s Name:  [facility]   Medical Record No.  [id number]   Start of Care Date:  06/24/22   Onset Date:   06/15/22 (Order date)   Type:     _X__PT   ___OT Medical Diagnosis:    Encounter Diagnoses   Name Primary?    Cervical radiculopathy     Neck pain Yes        Treatment Diagnosis:  Cervical radiculopathy        Goals:     06/24/22 0500   Body Part   Goals listed below are for Neck   Goal #1   Goal #1 sleeping   Previous Functional Level No restrictions   Performance Level Pain-free sleeping   Current Functional Level 2-3 hours without sleep per night   Performance Level Neck pain 4/10   STG Target Performance 1-2 hours without sleep per night   Performance Level Neck pain <2/10   Rationale to establish restorative sleep pattern   Due Date 07/15/22   LTG Target Performance Less than 1 hour without sleep per night   Performance level Pain-free   Rationale to establish restorative sleep pattern   Due Date 08/24/22   Goal #2   Goal #2 driving/transportation   Previous Functional Level No restrictions;Drove using side and rearview mirrors painfree   Performance Level Pain-free full motion   Current Functional Level Unable to rotate neck to look over shoulders;Drives using side and rearview mirrors   Performance Level Pain 3/10   STG Target Performance Able to look over either shoulder   (Improved ROM to 65 deg rot JONI)   Performance Level pain <2/10   Rationale for safe driving   Due date 07/15/22   LTG Target Performance Able to look over either shoulder   Performance Level 70+ degrees, pain-free   Rationale for safe driving   Due date  08/24/22       Therapy Frequency:  1x/week  Predicted Duration of Therapy Intervention:  x1 month, tapering to 2x/month x1 month    Denise Santana, PT                 I CERTIFY THE NEED FOR THESE SERVICES FURNISHED UNDER        THIS PLAN OF TREATMENT AND WHILE UNDER MY CARE     (Physician attestation of this document indicates review and certification of the therapy plan).                     Certification Date From:  06/24/22   Certification Date To:  08/24/22    Referring Provider:  Tunde Painter    Initial Assessment        See Epic Evaluation SOC Date: 06/24/22

## 2022-07-01 ENCOUNTER — THERAPY VISIT (OUTPATIENT)
Dept: PHYSICAL THERAPY | Facility: CLINIC | Age: 77
End: 2022-07-01
Payer: COMMERCIAL

## 2022-07-01 DIAGNOSIS — M54.12 CERVICAL RADICULOPATHY: Primary | ICD-10-CM

## 2022-07-01 DIAGNOSIS — M54.2 NECK PAIN: ICD-10-CM

## 2022-07-01 PROCEDURE — 97112 NEUROMUSCULAR REEDUCATION: CPT | Mod: GP | Performed by: PHYSICAL THERAPIST

## 2022-07-01 PROCEDURE — 97110 THERAPEUTIC EXERCISES: CPT | Mod: GP | Performed by: PHYSICAL THERAPIST

## 2022-07-01 PROCEDURE — 97140 MANUAL THERAPY 1/> REGIONS: CPT | Mod: GP | Performed by: PHYSICAL THERAPIST

## 2022-07-09 ENCOUNTER — ANCILLARY PROCEDURE (OUTPATIENT)
Dept: MRI IMAGING | Facility: CLINIC | Age: 77
End: 2022-07-09
Attending: INTERNAL MEDICINE
Payer: COMMERCIAL

## 2022-07-09 DIAGNOSIS — D49.0 IPMN (INTRADUCTAL PAPILLARY MUCINOUS NEOPLASM): ICD-10-CM

## 2022-07-09 PROCEDURE — A9585 GADOBUTROL INJECTION: HCPCS | Performed by: RADIOLOGY

## 2022-07-09 PROCEDURE — 74183 MRI ABD W/O CNTR FLWD CNTR: CPT | Mod: GC | Performed by: RADIOLOGY

## 2022-07-09 RX ORDER — GADOBUTROL 604.72 MG/ML
7.5 INJECTION INTRAVENOUS ONCE
Status: COMPLETED | OUTPATIENT
Start: 2022-07-09 | End: 2022-07-09

## 2022-07-09 RX ADMIN — GADOBUTROL 6 ML: 604.72 INJECTION INTRAVENOUS at 10:55

## 2022-07-15 ENCOUNTER — THERAPY VISIT (OUTPATIENT)
Dept: PHYSICAL THERAPY | Facility: CLINIC | Age: 77
End: 2022-07-15
Payer: COMMERCIAL

## 2022-07-15 DIAGNOSIS — M54.2 NECK PAIN: ICD-10-CM

## 2022-07-15 DIAGNOSIS — M54.12 CERVICAL RADICULOPATHY: Primary | ICD-10-CM

## 2022-07-15 PROCEDURE — 97110 THERAPEUTIC EXERCISES: CPT | Mod: GP | Performed by: PHYSICAL THERAPIST

## 2022-07-15 PROCEDURE — 97112 NEUROMUSCULAR REEDUCATION: CPT | Mod: GP | Performed by: PHYSICAL THERAPIST

## 2022-07-15 PROCEDURE — 97530 THERAPEUTIC ACTIVITIES: CPT | Mod: GP | Performed by: PHYSICAL THERAPIST

## 2022-07-29 ENCOUNTER — THERAPY VISIT (OUTPATIENT)
Dept: PHYSICAL THERAPY | Facility: CLINIC | Age: 77
End: 2022-07-29
Payer: COMMERCIAL

## 2022-07-29 DIAGNOSIS — M54.12 CERVICAL RADICULOPATHY: Primary | ICD-10-CM

## 2022-07-29 DIAGNOSIS — M54.2 NECK PAIN: ICD-10-CM

## 2022-07-29 PROCEDURE — 97110 THERAPEUTIC EXERCISES: CPT | Mod: GP | Performed by: PHYSICAL THERAPIST

## 2022-07-29 PROCEDURE — 97112 NEUROMUSCULAR REEDUCATION: CPT | Mod: GP | Performed by: PHYSICAL THERAPIST

## 2022-10-30 ENCOUNTER — HEALTH MAINTENANCE LETTER (OUTPATIENT)
Age: 77
End: 2022-10-30

## 2022-10-30 ENCOUNTER — MYC MEDICAL ADVICE (OUTPATIENT)
Dept: INTERNAL MEDICINE | Facility: CLINIC | Age: 77
End: 2022-10-30

## 2023-02-01 DIAGNOSIS — E78.5 HYPERLIPIDEMIA LDL GOAL <100: ICD-10-CM

## 2023-02-03 RX ORDER — ATORVASTATIN CALCIUM 20 MG/1
20 TABLET, FILM COATED ORAL DAILY
Qty: 90 TABLET | Refills: 0 | Status: SHIPPED | OUTPATIENT
Start: 2023-02-03 | End: 2023-05-09

## 2023-02-03 NOTE — TELEPHONE ENCOUNTER
ATORVASTATIN 20 MG TABLET      Last Written Prescription Date:  12/21/21  Last Fill Quantity: 90,   # refills: 3  Last Office Visit : 6/15/22  Future Office visit:  none    Routing refill request to provider for review/approval because:  Overdue for lab: LDL    90d raman refill sent, routed to RN for follow up

## 2023-02-08 ENCOUNTER — APPOINTMENT (OUTPATIENT)
Dept: URBAN - METROPOLITAN AREA CLINIC 256 | Age: 78
Setting detail: DERMATOLOGY
End: 2023-02-08

## 2023-02-08 VITALS — WEIGHT: 135 LBS | HEIGHT: 69 IN

## 2023-02-08 DIAGNOSIS — D485 NEOPLASM OF UNCERTAIN BEHAVIOR OF SKIN: ICD-10-CM

## 2023-02-08 DIAGNOSIS — L72.8 OTHER FOLLICULAR CYSTS OF THE SKIN AND SUBCUTANEOUS TISSUE: ICD-10-CM

## 2023-02-08 DIAGNOSIS — L57.0 ACTINIC KERATOSIS: ICD-10-CM

## 2023-02-08 PROBLEM — D48.5 NEOPLASM OF UNCERTAIN BEHAVIOR OF SKIN: Status: ACTIVE | Noted: 2023-02-08

## 2023-02-08 PROCEDURE — 99213 OFFICE O/P EST LOW 20 MIN: CPT

## 2023-02-08 PROCEDURE — OTHER PHOTO-DOCUMENTATION: OTHER

## 2023-02-08 PROCEDURE — OTHER MIPS QUALITY: OTHER

## 2023-02-08 PROCEDURE — OTHER COUNSELING: OTHER

## 2023-02-08 ASSESSMENT — LOCATION SIMPLE DESCRIPTION DERM: LOCATION SIMPLE: LEFT INFERIOR EYELID

## 2023-02-08 ASSESSMENT — LOCATION ZONE DERM: LOCATION ZONE: EYELID

## 2023-02-08 ASSESSMENT — LOCATION DETAILED DESCRIPTION DERM: LOCATION DETAILED: LEFT MEDIAL INFERIOR EYELID

## 2023-02-08 NOTE — PROCEDURE: COUNSELING
Detail Level: Detailed
Patient Specific Counseling (Will Not Stick From Patient To Patient): -Patient deferred LN2 treatment today since she has something coming up soon. \\n- Discussed she should come back in the summer months for her FBSE and she can be treated at that time, unless she would like to come in before that time.
Patient Specific Counseling (Will Not Stick From Patient To Patient): We will watch this lesion for enlargement and consider biopsy for possible BCC in the future
Patient Specific Counseling (Will Not Stick From Patient To Patient): - Discussed with the patient that we will monitor and if unresolved in two months she can come back to have it p re-evaluated

## 2023-02-08 NOTE — PROCEDURE: MIPS QUALITY
Quality 111:Pneumonia Vaccination Status For Older Adults: Pneumococcal vaccine (PPSV23) administered on or after patient’s 60th birthday and before the end of the measurement period
Quality 431: Preventive Care And Screening: Unhealthy Alcohol Use - Screening: Patient not identified as an unhealthy alcohol user when screened for unhealthy alcohol use using a systematic screening method
Detail Level: Detailed
Quality 226: Preventive Care And Screening: Tobacco Use: Screening And Cessation Intervention: Patient screened for tobacco use and is an ex/non-smoker
Quality 110: Preventive Care And Screening: Influenza Immunization: Influenza Immunization Administered during Influenza season
Quality 130: Documentation Of Current Medications In The Medical Record: Current Medications Documented

## 2023-02-08 NOTE — HPI: SKIN LESIONS
Have Your Skin Lesions Been Treated?: not been treated
Is This A New Presentation, Or A Follow-Up?: Growth
Additional History: Patient has had the growth for 1 year, but in the last two weeks
Which Family Member (Optional)?: Father and brother

## 2023-02-08 NOTE — PROCEDURE: PHOTO-DOCUMENTATION
Details (Free Text): Monitor
Detail Level: Zone
Photo Preface (Leave Blank If You Do Not Want): Photographs were obtained today

## 2023-02-09 DIAGNOSIS — K86.2 PANCREATIC CYST: Primary | ICD-10-CM

## 2023-04-08 ENCOUNTER — HEALTH MAINTENANCE LETTER (OUTPATIENT)
Age: 78
End: 2023-04-08

## 2023-04-22 ENCOUNTER — ANCILLARY PROCEDURE (OUTPATIENT)
Dept: MRI IMAGING | Facility: CLINIC | Age: 78
End: 2023-04-22
Attending: INTERNAL MEDICINE
Payer: COMMERCIAL

## 2023-04-22 DIAGNOSIS — K86.2 PANCREATIC CYST: ICD-10-CM

## 2023-04-22 PROCEDURE — A9585 GADOBUTROL INJECTION: HCPCS | Performed by: STUDENT IN AN ORGANIZED HEALTH CARE EDUCATION/TRAINING PROGRAM

## 2023-04-22 PROCEDURE — 74183 MRI ABD W/O CNTR FLWD CNTR: CPT | Performed by: STUDENT IN AN ORGANIZED HEALTH CARE EDUCATION/TRAINING PROGRAM

## 2023-04-22 RX ORDER — GADOBUTROL 604.72 MG/ML
6 INJECTION INTRAVENOUS ONCE
Status: COMPLETED | OUTPATIENT
Start: 2023-04-22 | End: 2023-04-22

## 2023-04-22 RX ADMIN — GADOBUTROL 6 ML: 604.72 INJECTION INTRAVENOUS at 11:00

## 2023-04-22 NOTE — DISCHARGE INSTRUCTIONS
MRI Contrast Discharge Instructions    The IV contrast you received today will pass out of your body in your  urine. This will happen in the next 24 hours. You will not feel this process.  Your urine will not change color.    Drink at least 4 extra glasses of water or juice today (unless your doctor  has restricted your fluids). This reduces the stress on your kidneys.  You may take your regular medicines.    If you are on dialysis: It is best to have dialysis today.    If you have a reaction: Most reactions happen right away. If you have  any new symptoms after leaving the hospital (such as hives or swelling),  call your hospital at the correct number below. Or call your family doctor.  If you have breathing distress or wheezing, call 911.    Special instructions: ***    I have read and understand the above information.    Signature:______________________________________ Date:___________    Staff:__________________________________________ Date:___________     Time:__________    [facility] Departments:    ___[place]: [phone]  ___[facility]: [phone]  ___[place]: [phone] ___[facility]: [phone]  ___[facility]: [phone]  ___[facility]: [phone]  ___[phone]  ___[facility]: [phone]  ___[phone]

## 2023-04-24 ENCOUNTER — DOCUMENTATION ONLY (OUTPATIENT)
Dept: GASTROENTEROLOGY | Facility: CLINIC | Age: 78
End: 2023-04-24
Payer: COMMERCIAL

## 2023-04-24 NOTE — PROGRESS NOTES
Called PT and confirmed Appt.    Called to remind patient of their upcoming appointment with our GI clinic, on 05/01/23 at 10:40 AM with Dr. Jerald Maradiaga. This appointment is scheduled as an in-person appt. Please arrive 15 minutes early to check in for your appointment. , if your appointment is virtual (video or telephone) you need to be in [place] for the visit. To reschedule or cancel patient to call [phone].      SK

## 2023-05-01 ENCOUNTER — OFFICE VISIT (OUTPATIENT)
Dept: GASTROENTEROLOGY | Facility: CLINIC | Age: 78
End: 2023-05-01
Attending: INTERNAL MEDICINE
Payer: COMMERCIAL

## 2023-05-01 VITALS
DIASTOLIC BLOOD PRESSURE: 70 MMHG | WEIGHT: 147.5 LBS | TEMPERATURE: 98.4 F | OXYGEN SATURATION: 96 % | SYSTOLIC BLOOD PRESSURE: 118 MMHG | HEART RATE: 65 BPM | BODY MASS INDEX: 21.97 KG/M2

## 2023-05-01 DIAGNOSIS — K86.2 PANCREAS CYST: Primary | ICD-10-CM

## 2023-05-01 PROCEDURE — 99212 OFFICE O/P EST SF 10 MIN: CPT | Performed by: INTERNAL MEDICINE

## 2023-05-01 PROCEDURE — G0463 HOSPITAL OUTPT CLINIC VISIT: HCPCS | Performed by: INTERNAL MEDICINE

## 2023-05-01 ASSESSMENT — PAIN SCALES - GENERAL: PAINLEVEL: NO PAIN (0)

## 2023-05-01 NOTE — NURSING NOTE
"Oncology Rooming Note    May 1, 2023 10:57 AM   Zelda Osman is a 77 year old female who presents for:    Chief Complaint   Patient presents with     Oncology Clinic Visit     NEW EVAL: Pancreatic cyst     Initial Vitals: /70   Pulse 65   Temp 98.4  F (36.9  C) (Oral)   Wt 66.9 kg (147 lb 8 oz)   SpO2 96%   BMI 21.97 kg/m   Estimated body mass index is 21.97 kg/m  as calculated from the following:    Height as of 12/21/21: 1.745 m (5' 8.7\").    Weight as of this encounter: 66.9 kg (147 lb 8 oz). Body surface area is 1.8 meters squared.  No Pain (0) Comment: Data Unavailable   No LMP recorded. Patient has had a hysterectomy.  Allergies reviewed: Yes  Medications reviewed: Yes    Medications: Medication refills not needed today.  Pharmacy name entered into [employer] [facility] [id number] [place] [address]    Clinical concerns: none    Fermín Wilkes            "

## 2023-05-01 NOTE — LETTER
5/1/2023         RE: Zelda Osman  [address] [address]        Dear Colleague,    Thank you for referring your patient, eZlda Osman, to the [facility]. Please see a copy of my visit note below.    [facility]  GASTROENTEROLOGY PROGRESS NOTE  Zelda Osman [id number]     SUBJECTIVE:  78 yo female who is being seen for pancreatic cyst surveillance. She was initially seen 11/4/19. The cysts were initially seen during evaluation of RUQ pain, which has resolved. She has had MRI imaging 9/8/19, 7/25/20, 7/9/22 and most recently 4/22/23. She is being seen today to review this most recent imaging. She has not had EUS evaluation. There is no history of acute or chronic pancreatitis. She denies abdominal pain, weight loss (has been gaining), jaundice or diarrhea.    The most recent MRI was read as:  IMPRESSION:No mass suggestive of pancreatic cancer. Similar sized  pancreatic cysts, likely side branch IPMN's measuring up to 1.4 cm  compared to 7/9/2022. Recommend follow-up as detailed below.    OBJECTIVE:  VS: /70   Pulse 65   Temp 98.4  F (36.9  C) (Oral)   Wt 66.9 kg (147 lb 8 oz)   SpO2 96%   BMI 21.97 kg/m     GEN: A&Ox3, NAD, comfortable  Anicteric. No jaundice.    IMPRESSION:  Zelda Osman is a 77 year old female with multiple incidentally-identified simple pancreatic cysts. The largest measures up to 14 mm in diameter. These are stable on imaging and without high-risk or worrisome features.     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    We discussed that I typically stop surveillance at age 75, which is analagous to colon cancer surveillance. Although this is not specifically stated in formal guidelines, this is common practice/standard of care across the [place] based on my discussions with other providers. She has now had established stability over 4 years.    RECOMMENDATIONS:  Discontinue pancreatic  surveillance.  Follow-up with me prn.    It was a pleasure to participate in the care of this patient; please contact us with any further questions.  A total of 15 minutes was spent on the day of the visit, >50% of which was counseling regarding the above delineated issues. The remainder was review of records and imaging as well as documentation and coordination of care.        Again, thank you for allowing me to participate in the care of your patient.      Sincerely,    Tunde Maradiaga MD

## 2023-05-03 ASSESSMENT — ENCOUNTER SYMPTOMS
MUSCLE WEAKNESS: 0
MYALGIAS: 0
NECK PAIN: 0
STIFFNESS: 0
JOINT SWELLING: 0
ARTHRALGIAS: 1
MUSCLE CRAMPS: 0
BACK PAIN: 0

## 2023-05-04 DIAGNOSIS — E78.5 HYPERLIPIDEMIA LDL GOAL <100: ICD-10-CM

## 2023-05-08 NOTE — TELEPHONE ENCOUNTER
ATORVASTATIN 20 MG TABLET      Last Written Prescription Date:  2-3-23  Last Fill Quantity: 90,   # refills: 0  Last Office Visit : 6-15-22  Future Office visit:  5-10-23    Routing refill request to provider for review/approval because:  Overdue lab, previous raman MARTINEZ

## 2023-05-09 RX ORDER — ATORVASTATIN CALCIUM 20 MG/1
20 TABLET, FILM COATED ORAL DAILY
Qty: 90 TABLET | Refills: 0 | Status: SHIPPED | OUTPATIENT
Start: 2023-05-09 | End: 2023-05-10

## 2023-05-10 ENCOUNTER — OFFICE VISIT (OUTPATIENT)
Dept: INTERNAL MEDICINE | Facility: CLINIC | Age: 78
End: 2023-05-10
Payer: COMMERCIAL

## 2023-05-10 VITALS
WEIGHT: 142 LBS | OXYGEN SATURATION: 98 % | HEIGHT: 69 IN | DIASTOLIC BLOOD PRESSURE: 68 MMHG | BODY MASS INDEX: 21.03 KG/M2 | SYSTOLIC BLOOD PRESSURE: 107 MMHG | HEART RATE: 69 BPM

## 2023-05-10 DIAGNOSIS — E03.8 SUBCLINICAL HYPOTHYROIDISM: ICD-10-CM

## 2023-05-10 DIAGNOSIS — M85.89 OSTEOPENIA OF MULTIPLE SITES: ICD-10-CM

## 2023-05-10 DIAGNOSIS — E78.5 HYPERLIPIDEMIA LDL GOAL <100: ICD-10-CM

## 2023-05-10 DIAGNOSIS — M19.041 OSTEOARTHRITIS OF BOTH HANDS, UNSPECIFIED OSTEOARTHRITIS TYPE: Primary | ICD-10-CM

## 2023-05-10 DIAGNOSIS — M19.042 OSTEOARTHRITIS OF BOTH HANDS, UNSPECIFIED OSTEOARTHRITIS TYPE: Primary | ICD-10-CM

## 2023-05-10 PROCEDURE — 91312 COVID-19 BIVALENT 12+ (PFIZER): CPT | Performed by: INTERNAL MEDICINE

## 2023-05-10 PROCEDURE — 0124A COVID-19 BIVALENT 12+ (PFIZER): CPT | Performed by: INTERNAL MEDICINE

## 2023-05-10 PROCEDURE — G0439 PPPS, SUBSEQ VISIT: HCPCS | Performed by: INTERNAL MEDICINE

## 2023-05-10 RX ORDER — ATORVASTATIN CALCIUM 20 MG/1
20 TABLET, FILM COATED ORAL DAILY
Qty: 90 TABLET | Refills: 3 | Status: SHIPPED | OUTPATIENT
Start: 2023-05-10 | End: 2023-08-11

## 2023-05-10 NOTE — NURSING NOTE
Zelda Osman is a 77 year old female that presents in clinic today for the following:     No chief complaint on file.      The patient's allergies and medications were reviewed. The patient's vitals were obtained without incident. The patient does not have any other questions for the provider.     Cecille Ellis, EMT at 7:58 AM on 5/10/2023.  Primary Care Clinic: [phone]

## 2023-05-10 NOTE — PROGRESS NOTES
Medicare Annual Wellness Questionnaire:  This 77 year old year old female presents for a Medicare Wellness Exam.    Fall Risk Assessment:  Have you fallen 2 or more times in the last year?  No    How many times were you injured due to a fall in the last year?  0    PHQ-2:  Over the last 2 weeks, how often have you been bothered by feeling down, depressed, or hopeless?  Not at all (0)     Over the last 2 weeks, how often have you had little interest or pleasure in doing things?  Not at all (0)     Social History:  What is your marital status?      Who lives in your household?  me    Does your home have loose rugs in the hallway:     No    Does your home have grab bars in the bathroom:    Yes     Does your home have handrails on the stairs?  Yes     Does your home have poorly lit areas?    No    Do you feel threatened or controlled by a partner, ex-partner or anyone in your life?   No    Has anyone hurt you physically, for example by pushing, hitting, slapping or kicking you or forcing you to have sex?   No    Do you need help with the phone, transportation, shopping, preparing meals, housework, laundry, medications or managing money?   No    Sexual Health:  Are you sexually active?    No    Have you had any sexually transmitted infections in the last year?   No    Do you have any sexual concerns?    No    Women Only:  Women: What year did you stop having periods (approximate age)?  1972    Women: Any vaginal bleeding in the last year?    No    Women: Have you ever had an abnormal Pap smear?    No    General Health Assessment:  Have you noticed any hearing difficulties?   Yes     Do you wear hearing aids?   No    Have you seen a hearing professional such as an audiologist in the last 1 year?   No    Do you have vision difficulty?    No    Do you wear glasses or contacts?   Yes     Have you seen an eye doctor in the last 1 year?   Yes     How many servings of fruits and vegetables do you eat a day?  Fruit:  2  Vegetables: 2    How often do you exercise in a week?  5    How long and what kind of exercise do you do?  Bike - 2 hrs  Hike- 2 hrs  Lift weights- 1 hr    Tobacco and Alcohol History:    Pt left page blank.     VIPUL Turk at 9:36 AM on 5/10/2023

## 2023-05-10 NOTE — PATIENT INSTRUCTIONS
To schedule a lab appointment at the [facility], please call [phone].

## 2023-05-10 NOTE — PROGRESS NOTES
HPI  77-year-old presents today for Medicare wellness visit.  She has been doing well.  She is exercising regularly she has been snowshoeing and cross-country skiing this winter is no cycling in the summer.  She is tolerating this well has been asymptomatic.  Her diet is good.  She did have some thumb pain bilaterally with cross-country skiing.  There is pain in the CMC joint of both thumbs.  There is no preceding injury or trauma in association with this.  Otherwise he has not tried anything to help alleviate the pain with his symptoms.  No problems on the atorvastatin.  She has been following with GI regarding her pancreatic cysts and they told her that no further follow-up is necessary.  Is a little uncomfortable with this decision.  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     Subclinical hypothyroidism      Past Surgical History:   Procedure Laterality Date     BILATERAL SALPINGOOPHORECTOMY       bowel obstruction due to adhesions       HC REMOVAL OF TONSILS,<13 Y/O      Description: Tonsillectomy;  Recorded: 08/18/2011;     HYSTERECTOMY       HYSTERECTOMY       OOPHORECTOMY Bilateral      ZZC APPENDECTOMY      Description: Appendectomy;  Recorded: 09/25/2011;     ZZC REMOVAL OF OVARY(S)      Description: Oophorectomy;  Recorded: 09/25/2011;     Family History   Problem Relation Age of Onset     Cardiomyopathy Father      Prostate Cancer Father      Answers for HPI/ROS submitted by the patient on 5/3/2023  General Symptoms:  "No  Skin Symptoms: No  HENT Symptoms: No  EYE SYMPTOMS: No  HEART SYMPTOMS: No  LUNG SYMPTOMS: No  INTESTINAL SYMPTOMS: No  URINARY SYMPTOMS: No  GYNECOLOGIC SYMPTOMS: No  BREAST SYMPTOMS: No  SKELETAL SYMPTOMS: Yes  BLOOD SYMPTOMS: No  NERVOUS SYSTEM SYMPTOMS: No  MENTAL HEALTH SYMPTOMS: No  Back pain: No  Muscle aches: No  Neck pain: No  Swollen joints: No  Joint pain: Yes  Bone pain: No  Muscle cramps: No  Muscle weakness: No  Joint stiffness: No  Bone fracture: No        Exam:  /68 (BP Location: Right arm, Patient Position: Sitting, Cuff Size: Adult Regular)   Pulse 69   Ht 1.745 m (5' 8.7\")   Wt 64.4 kg (142 lb)   SpO2 98%   BMI 21.15 kg/m    142 lbs 0 oz  PHYSICAL EXAMINATION:   The patient is alert, oriented with a clear sensorium.   Skin shows no lesions or rashes and good turgor.   Head is normocephalic and atraumatic.   Eyes show PERRLA.  Ears show normal TMs bilaterally.   Mouth shows clear oral mucosa.   Neck shows no nodes, thyromegaly or bruits.   Back is nontender.   Lungs are clear to percussion and auscultation.   Heart shows normal S1 and S2 without murmur or gallop.   Abdomen is soft, nontender without masses or organomegaly.   Extremities show DJD both CMC joints of thumbs, no edema and no evidence of active synovitis.   Neurologic examination shows cranial nerves II-XII intact. Motor shows normal strength. Reflexes are full and symmetrical.       ASSESSMENT  1 cervical degenerative disc disease  2 osteoporosis on supplemental calcium and vitamin D   3 Small IPMN needs follow-up MR in 2024  4 benign gallbladder polyp  5 Subclinical hypothyroidism  7 Hypoalbuminemia  8 hyperlipidemia on atorvastatin  9 DJD thumbs    Plan  We will have her try diclofenac gel on the thumbs.  However follow-up for fasting labs we will update her immunizations with a COVID booster today.  Plan to have her follow-up in a year and we will reassess her pancreatic MRI at that    This note was completed using " [employer] voice recognition software.      Tunde Painter MD  General Internal Medicine  [facility]  [phone]

## 2023-05-12 ENCOUNTER — LAB (OUTPATIENT)
Dept: LAB | Facility: CLINIC | Age: 78
End: 2023-05-12
Payer: COMMERCIAL

## 2023-05-12 DIAGNOSIS — E78.5 HYPERLIPIDEMIA LDL GOAL <100: ICD-10-CM

## 2023-05-12 DIAGNOSIS — E03.8 SUBCLINICAL HYPOTHYROIDISM: ICD-10-CM

## 2023-05-12 DIAGNOSIS — M85.89 OSTEOPENIA OF MULTIPLE SITES: ICD-10-CM

## 2023-05-12 LAB
ALBUMIN SERPL BCG-MCNC: 4 G/DL (ref 3.5–5.2)
ALP SERPL-CCNC: 53 U/L (ref 35–104)
ALT SERPL W P-5'-P-CCNC: 14 U/L (ref 10–35)
ANION GAP SERPL CALCULATED.3IONS-SCNC: 10 MMOL/L (ref 7–15)
AST SERPL W P-5'-P-CCNC: 21 U/L (ref 10–35)
BILIRUB SERPL-MCNC: 0.3 MG/DL
BUN SERPL-MCNC: 15.9 MG/DL (ref 8–23)
CALCIUM SERPL-MCNC: 9.5 MG/DL (ref 8.8–10.2)
CHLORIDE SERPL-SCNC: 106 MMOL/L (ref 98–107)
CHOLEST SERPL-MCNC: 149 MG/DL
CREAT SERPL-MCNC: 0.95 MG/DL (ref 0.51–0.95)
DEPRECATED HCO3 PLAS-SCNC: 24 MMOL/L (ref 22–29)
GFR SERPL CREATININE-BSD FRML MDRD: 61 ML/MIN/1.73M2
GLUCOSE SERPL-MCNC: 101 MG/DL (ref 70–99)
HDLC SERPL-MCNC: 58 MG/DL
LDLC SERPL CALC-MCNC: 81 MG/DL
NONHDLC SERPL-MCNC: 91 MG/DL
POTASSIUM SERPL-SCNC: 4.7 MMOL/L (ref 3.4–5.3)
PROT SERPL-MCNC: 6.5 G/DL (ref 6.4–8.3)
SODIUM SERPL-SCNC: 140 MMOL/L (ref 136–145)
T4 FREE SERPL-MCNC: 1.1 NG/DL (ref 0.9–1.7)
TRIGL SERPL-MCNC: 50 MG/DL
TSH SERPL DL<=0.005 MIU/L-ACNC: 6.81 UIU/ML (ref 0.3–4.2)

## 2023-05-12 PROCEDURE — 80061 LIPID PANEL: CPT

## 2023-05-12 PROCEDURE — 36415 COLL VENOUS BLD VENIPUNCTURE: CPT

## 2023-05-12 PROCEDURE — 84443 ASSAY THYROID STIM HORMONE: CPT

## 2023-05-12 PROCEDURE — 84439 ASSAY OF FREE THYROXINE: CPT

## 2023-05-12 PROCEDURE — 80053 COMPREHEN METABOLIC PANEL: CPT

## 2023-05-15 NOTE — PROGRESS NOTES
DISCHARGE SUMMARY    Zelda Osman was seen 4 times for evaluation and treatment.  Patient did not return for further treatment and current status is unknown.  Due to short treatment duration, no objective or functional changes were made.  Please see goal flow sheet from episode noted date below and initial evaluation for further information.  Patient is discharged from therapy and therapy episode is resolved as of 05/15/23.      Linked Episodes   Type: Episode: Status: Noted: Resolved: Last update: Updated by:   PHYSICAL THERAPY Neck 6/24/2022 Active 6/24/2022 7/29/2022  9:21 AM Denise Santana, PT      Comments:

## 2023-05-23 NOTE — PROGRESS NOTES
[facility]  GASTROENTEROLOGY PROGRESS NOTE  Zelda Osman [id number]     SUBJECTIVE:  76 yo female who is being seen for pancreatic cyst surveillance. She was initially seen 11/4/19. The cysts were initially seen during evaluation of RUQ pain, which has resolved. She has had MRI imaging 9/8/19, 7/25/20, 7/9/22 and most recently 4/22/23. She is being seen today to review this most recent imaging. She has not had EUS evaluation. There is no history of acute or chronic pancreatitis. She denies abdominal pain, weight loss (has been gaining), jaundice or diarrhea.    The most recent MRI was read as:  IMPRESSION:No mass suggestive of pancreatic cancer. Similar sized  pancreatic cysts, likely side branch IPMN's measuring up to 1.4 cm  compared to 7/9/2022. Recommend follow-up as detailed below.    OBJECTIVE:  VS: /70   Pulse 65   Temp 98.4  F (36.9  C) (Oral)   Wt 66.9 kg (147 lb 8 oz)   SpO2 96%   BMI 21.97 kg/m     GEN: A&Ox3, NAD, comfortable  Anicteric. No jaundice.    IMPRESSION:  Zelda Osman is a 77 year old female with multiple incidentally-identified simple pancreatic cysts. The largest measures up to 14 mm in diameter. These are stable on imaging and without high-risk or worrisome features.     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    We discussed that I typically stop surveillance at age 75, which is analagous to colon cancer surveillance. Although this is not specifically stated in formal guidelines, this is common practice/standard of care across the [place] based on my discussions with other providers. She has now had established stability over 4 years.    RECOMMENDATIONS:  Discontinue pancreatic surveillance.  Follow-up with me prn.    It was a pleasure to participate in the care of this patient; please contact us with any further questions.  A total of 15 minutes was spent on the day of the visit, >50% of which was counseling regarding the above delineated issues. The  remainder was review of records and imaging as well as documentation and coordination of care.    ALYSIA Maradiaga MD  Professor of Medicine  Division of Gastroenterology, Hepatology and Nutrition  [facility]  5/23/2023

## 2023-06-14 ENCOUNTER — APPOINTMENT (OUTPATIENT)
Dept: URBAN - METROPOLITAN AREA CLINIC 256 | Age: 78
Setting detail: DERMATOLOGY
End: 2023-06-14

## 2023-06-14 VITALS — HEIGHT: 69 IN | WEIGHT: 140 LBS

## 2023-06-14 DIAGNOSIS — L57.8 OTHER SKIN CHANGES DUE TO CHRONIC EXPOSURE TO NONIONIZING RADIATION: ICD-10-CM

## 2023-06-14 DIAGNOSIS — L82.1 OTHER SEBORRHEIC KERATOSIS: ICD-10-CM

## 2023-06-14 DIAGNOSIS — D22 MELANOCYTIC NEVI: ICD-10-CM

## 2023-06-14 DIAGNOSIS — L57.0 ACTINIC KERATOSIS: ICD-10-CM

## 2023-06-14 DIAGNOSIS — D18.0 HEMANGIOMA: ICD-10-CM

## 2023-06-14 DIAGNOSIS — Z71.89 OTHER SPECIFIED COUNSELING: ICD-10-CM

## 2023-06-14 DIAGNOSIS — T300 ERYTHEMA [FIRST DEGREE], UNSPECIFIED SITE: ICD-10-CM

## 2023-06-14 PROBLEM — T23.102A BURN OF FIRST DEGREE OF LEFT HAND, UNSPECIFIED SITE, INITIAL ENCOUNTER: Status: ACTIVE | Noted: 2023-06-14

## 2023-06-14 PROBLEM — D22.72 MELANOCYTIC NEVI OF LEFT LOWER LIMB, INCLUDING HIP: Status: ACTIVE | Noted: 2023-06-14

## 2023-06-14 PROBLEM — D22.5 MELANOCYTIC NEVI OF TRUNK: Status: ACTIVE | Noted: 2023-06-14

## 2023-06-14 PROBLEM — D18.01 HEMANGIOMA OF SKIN AND SUBCUTANEOUS TISSUE: Status: ACTIVE | Noted: 2023-06-14

## 2023-06-14 PROBLEM — D22.71 MELANOCYTIC NEVI OF RIGHT LOWER LIMB, INCLUDING HIP: Status: ACTIVE | Noted: 2023-06-14

## 2023-06-14 PROBLEM — D23.71 OTHER BENIGN NEOPLASM OF SKIN OF RIGHT LOWER LIMB, INCLUDING HIP: Status: ACTIVE | Noted: 2023-06-14

## 2023-06-14 PROBLEM — D22.61 MELANOCYTIC NEVI OF RIGHT UPPER LIMB, INCLUDING SHOULDER: Status: ACTIVE | Noted: 2023-06-14

## 2023-06-14 PROBLEM — D22.62 MELANOCYTIC NEVI OF LEFT UPPER LIMB, INCLUDING SHOULDER: Status: ACTIVE | Noted: 2023-06-14

## 2023-06-14 PROCEDURE — OTHER MONITORING: OTHER

## 2023-06-14 PROCEDURE — OTHER LIQUID NITROGEN: OTHER

## 2023-06-14 PROCEDURE — OTHER COUNSELING: OTHER

## 2023-06-14 PROCEDURE — 17000 DESTRUCT PREMALG LESION: CPT

## 2023-06-14 PROCEDURE — 99213 OFFICE O/P EST LOW 20 MIN: CPT | Mod: 25

## 2023-06-14 PROCEDURE — OTHER MIPS QUALITY: OTHER

## 2023-06-14 PROCEDURE — OTHER EDUCATIONAL RESOURCES PROVIDED: OTHER

## 2023-06-14 ASSESSMENT — LOCATION DETAILED DESCRIPTION DERM
LOCATION DETAILED: RIGHT ANTERIOR DISTAL THIGH
LOCATION DETAILED: LEFT INFERIOR LATERAL MIDBACK
LOCATION DETAILED: SUPERIOR THORACIC SPINE
LOCATION DETAILED: LEFT ULNAR DORSAL HAND
LOCATION DETAILED: LEFT ANTECUBITAL SKIN
LOCATION DETAILED: LEFT MEDIAL UPPER BACK
LOCATION DETAILED: LEFT DISTAL PRETIBIAL REGION
LOCATION DETAILED: LEFT ANTERIOR DISTAL THIGH
LOCATION DETAILED: INFERIOR THORACIC SPINE
LOCATION DETAILED: RIGHT DISTAL PRETIBIAL REGION
LOCATION DETAILED: LEFT VENTRAL DISTAL FOREARM
LOCATION DETAILED: LEFT INFERIOR CENTRAL MALAR CHEEK
LOCATION DETAILED: LEFT VENTRAL PROXIMAL FOREARM
LOCATION DETAILED: RIGHT VENTRAL DISTAL FOREARM
LOCATION DETAILED: LEFT RADIAL DORSAL HAND
LOCATION DETAILED: RIGHT VENTRAL PROXIMAL FOREARM

## 2023-06-14 ASSESSMENT — LOCATION SIMPLE DESCRIPTION DERM
LOCATION SIMPLE: LEFT UPPER ARM
LOCATION SIMPLE: LEFT PRETIBIAL REGION
LOCATION SIMPLE: LEFT THIGH
LOCATION SIMPLE: RIGHT FOREARM
LOCATION SIMPLE: RIGHT PRETIBIAL REGION
LOCATION SIMPLE: LEFT UPPER BACK
LOCATION SIMPLE: LEFT FOREARM
LOCATION SIMPLE: RIGHT THIGH
LOCATION SIMPLE: UPPER BACK
LOCATION SIMPLE: LEFT CHEEK
LOCATION SIMPLE: LEFT HAND
LOCATION SIMPLE: LEFT LOWER BACK

## 2023-06-14 ASSESSMENT — LOCATION ZONE DERM
LOCATION ZONE: ARM
LOCATION ZONE: FACE
LOCATION ZONE: LEG
LOCATION ZONE: TRUNK
LOCATION ZONE: HAND

## 2023-06-14 NOTE — HPI: FULL BODY SKIN EXAMINATION
What Is The Reason For Today's Visit?: Full Body Skin Examination
What Is The Reason For Today's Visit? (Being Monitored For X): concerning skin lesions on a periodic basis
Additional History: Patient reports a lesion on her left hand that she attributes to a potential burn. However, lesions in the past have presented similarly to this and have come back as skin cancer. Of note, there is another lesion on the back which has been previously examined that was recently scratched off causing it to bleed. Currently the area is not irritated or bothersome.

## 2023-06-14 NOTE — PROCEDURE: LIQUID NITROGEN
Render Post-Care Instructions In Note?: no
Number Of Freeze-Thaw Cycles: 1 freeze-thaw cycle
Detail Level: Detailed
Duration Of Freeze Thaw-Cycle (Seconds): 6
Post-Care Instructions: I reviewed with the patient in detail post-care instructions. Patient is to wear sunprotection, and avoid picking at any of the treated lesions. Pt may apply Vaseline to crusted or scabbing areas.
Application Tool (Optional): Liquid Nitrogen Sprayer
Consent: The patient's consent was obtained including but not limited to risks of crusting, scabbing, blistering, scarring, darker or lighter pigmentary change, recurrence, incomplete removal and infection.
Show Applicator Variable?: Yes

## 2023-07-26 ENCOUNTER — APPOINTMENT (OUTPATIENT)
Dept: URBAN - METROPOLITAN AREA CLINIC 253 | Age: 78
Setting detail: DERMATOLOGY
End: 2023-07-29

## 2023-07-26 PROBLEM — D48.5 NEOPLASM OF UNCERTAIN BEHAVIOR OF SKIN: Status: ACTIVE | Noted: 2023-07-26

## 2023-07-26 PROCEDURE — 99213 OFFICE O/P EST LOW 20 MIN: CPT | Mod: 25

## 2023-07-26 PROCEDURE — OTHER COUNSELING: OTHER

## 2023-07-26 PROCEDURE — OTHER MIPS QUALITY: OTHER

## 2023-07-26 PROCEDURE — OTHER BIOPSY BY SHAVE METHOD: OTHER

## 2023-07-26 PROCEDURE — 11102 TANGNTL BX SKIN SINGLE LES: CPT

## 2023-07-26 NOTE — PROCEDURE: COUNSELING
Detail Level: Detailed
Patient Specific Counseling (Will Not Stick From Patient To Patient): Previously treated with LN2. \\nRecommended skin biopsy.
Patient Specific Counseling (Will Not Stick From Patient To Patient): Recommended shave biopsy.

## 2023-07-26 NOTE — PROCEDURE: BIOPSY BY SHAVE METHOD

## 2023-07-31 ENCOUNTER — MYC MEDICAL ADVICE (OUTPATIENT)
Dept: INTERNAL MEDICINE | Facility: CLINIC | Age: 78
End: 2023-07-31
Payer: COMMERCIAL

## 2023-07-31 DIAGNOSIS — H91.93 BILATERAL HEARING LOSS: Primary | ICD-10-CM

## 2023-08-08 DIAGNOSIS — E78.5 HYPERLIPIDEMIA LDL GOAL <100: ICD-10-CM

## 2023-08-11 RX ORDER — ATORVASTATIN CALCIUM 20 MG/1
20 TABLET, FILM COATED ORAL DAILY
Qty: 90 TABLET | Refills: 2 | Status: SHIPPED | OUTPATIENT
Start: 2023-08-11 | End: 2024-05-24

## 2023-08-11 NOTE — TELEPHONE ENCOUNTER
atorvastatin (LIPITOR) 20 MG tablet       Statins Protocol Passed      5/10/2023  [facility] MD  Internal Medicine

## 2023-08-17 ENCOUNTER — APPOINTMENT (OUTPATIENT)
Dept: URBAN - METROPOLITAN AREA CLINIC 255 | Age: 78
Setting detail: DERMATOLOGY
End: 2023-08-20

## 2023-08-17 PROBLEM — C44.629 SQUAMOUS CELL CARCINOMA OF SKIN OF LEFT UPPER LIMB, INCLUDING SHOULDER: Status: ACTIVE | Noted: 2023-08-17

## 2023-08-17 PROCEDURE — OTHER MOHS SURGERY: OTHER

## 2023-08-17 PROCEDURE — 17311 MOHS 1 STAGE H/N/HF/G: CPT

## 2023-08-17 PROCEDURE — OTHER MIPS QUALITY: OTHER

## 2023-08-17 NOTE — PROCEDURE: MOHS SURGERY
Body Location Override (Optional - Billing Will Still Be Based On Selected Body Map Location If Applicable): Left Dorsal Hand (3rd Ray)

## 2023-08-17 NOTE — PROCEDURE: MOHS SURGERY
[Post Operative Visit] : a post operative visit for [Artificial Knee Joint] : artificial knee joint Retention Suture Text: Retention sutures were placed to support the closure and prevent dehiscence.

## 2023-08-17 NOTE — PROCEDURE: MIPS QUALITY
Quality 431: Preventive Care And Screening: Unhealthy Alcohol Use - Screening: Patient not identified as an unhealthy alcohol user when screened for unhealthy alcohol use using a systematic screening method
Quality 110: Preventive Care And Screening: Influenza Immunization: Influenza Immunization previously received during influenza season
Detail Level: Detailed
Quality 111:Pneumonia Vaccination Status For Older Adults: Patient received any pneumococcal conjugate or polysaccharide vaccine on or after their 60th birthday and before the end of the measurement period
Quality 226: Preventive Care And Screening: Tobacco Use: Screening And Cessation Intervention: Patient screened for tobacco use and is an ex/non-smoker
Quality 143: Oncology: Medical And Radiation- Pain Intensity Quantified: Pain severity quantified, no pain present
Quality 130: Documentation Of Current Medications In The Medical Record: Current Medications Documented

## 2023-08-17 NOTE — PROCEDURE: MOHS SURGERY
Abdomen soft, nontender, nondistended, bowel sounds present in all 4 quadrants. Deep Sutures: 5-0 PGLC

## 2023-08-17 NOTE — PROCEDURE: MOHS SURGERY
[facility]  MARISSA procedure Note         Today's Date: 1/16/2018  Primary/Ordering Cardiologist:   Indication: CVA    Operators:  Primary: Gareld Scheuermann (Attending Physician)  Assistant: Joey Angel (Cardiology Fellow)    Patient seen and examined. History and Physical reviewed. Labs reviewed. After informed consent was obtained with explanation of the risks and benefits, the patient was brought to Cath lab. All sedation was administered via the Cardiology department. The oropharynx was pre anesthetisized with cetacaine spray. A single intubation effort was required. MARISSA:    Structures:    LA: Normal in size. MIGUELITO: No clot or thrombus identified. RA: Normal in size. RV: Normal in size and systolic function. LV:  Hypertrophied with normal systolic function. Estimated LVEF: 55%. LV apex: No LV apical thrombus identified. Aorta: Mild atheromatous disease arch. Pericardium: No pericardial effusion. Septum: No intracardiac shunt via color Doppler. No intracardiac shunt via injection of agitated saline. Valves:    Mitral Valve: Structurally normal. Mild regurgitation is identified. Aortic Valve: The aortic valve is trileaflet and opens adequately. Mild regurgitiation is identified. Tricuspid valve: Structurally normal. Mild regurgitation is identified. Pulmonary valve: Normal. No significant regurgitation. No valvular vegetations or thrombus identified. Summary:     1. A MARISSA was performed without complications. 2. LVEF 55%. 3. No thrombus or valvular vegetation identified. 4. Mild MR, Mild TR, Mild AI    There were no complications encountered. The patient was discussed with the Attending. Julianne Roberson MD  Cardiology Fellow    Attending Physician Statement  I have discussed the care of the patient, including pertinent history and exam findings, with the resident.  I have seen and examined the patient and the key elements of all parts of the encounter have been performed by me. I agree with the assessment, plan and orders as documented by the resident.     Avinash Vaughan MD Special Stains Stage 1 - Results: Base On Clearance Noted Above

## 2023-08-25 ENCOUNTER — MYC MEDICAL ADVICE (OUTPATIENT)
Dept: INTERNAL MEDICINE | Facility: CLINIC | Age: 78
End: 2023-08-25
Payer: COMMERCIAL

## 2023-08-29 ENCOUNTER — APPOINTMENT (OUTPATIENT)
Dept: URBAN - METROPOLITAN AREA CLINIC 255 | Age: 78
Setting detail: DERMATOLOGY
End: 2023-08-29

## 2023-08-29 DIAGNOSIS — L98429 CHRONIC ULCER OF OTHER SPECIFIED SITES: ICD-10-CM

## 2023-08-29 DIAGNOSIS — L98419 CHRONIC ULCER OF OTHER SPECIFIED SITES: ICD-10-CM

## 2023-08-29 PROBLEM — L98.499 NON-PRESSURE CHRONIC ULCER OF SKIN OF OTHER SITES WITH UNSPECIFIED SEVERITY: Status: ACTIVE | Noted: 2023-08-29

## 2023-08-29 PROCEDURE — OTHER COUNSELING: OTHER

## 2023-08-29 PROCEDURE — 99213 OFFICE O/P EST LOW 20 MIN: CPT

## 2023-08-29 PROCEDURE — OTHER DIAGNOSIS COMMENT: OTHER

## 2023-08-29 PROCEDURE — OTHER MIPS QUALITY: OTHER

## 2023-08-29 PROCEDURE — OTHER ULCER EVALUATION: OTHER

## 2023-08-29 ASSESSMENT — LOCATION DETAILED DESCRIPTION DERM: LOCATION DETAILED: LEFT ULNAR DORSAL HAND

## 2023-08-29 ASSESSMENT — LOCATION SIMPLE DESCRIPTION DERM: LOCATION SIMPLE: LEFT HAND

## 2023-08-29 ASSESSMENT — LOCATION ZONE DERM: LOCATION ZONE: HAND

## 2023-08-29 NOTE — PROCEDURE: ULCER EVALUATION
Instructions (Optional): Physical Examination:\\nEschar: soft, fibrinous \\nGranulation Tissue: present\\nRe-Epithelialization: progressing \\nDrainage: serosanguinous\\nSurrounding Edema: absent\\nPain to palpation: 0/ 10\\nOdor: none\\nSurrounding Dermatitis: absent\\n\\nAssessment:\\nHealing as expected\\nNo signs / symptoms of infection\\n\\nPlan:\\nWound cleaned with H2O2\\nWhite petrolatum ointment applied\\nContinue QD wound care as instructed\\nRTC for routine skin checks
Detail Level: Detailed
Size Of Lesion In Cm (Optional): 1.1
X Size Of Lesion In Cm (Optional): 0.9

## 2023-08-29 NOTE — PROCEDURE: DIAGNOSIS COMMENT
Comment: S/P MMS of a Primary Well-Differentiated SCC (KA Type) on (08/17/23)
Detail Level: Simple
Render Risk Assessment In Note?: no

## 2023-08-29 NOTE — PROCEDURE: MIPS QUALITY
Quality 431: Preventive Care And Screening: Unhealthy Alcohol Use - Screening: Patient not identified as an unhealthy alcohol user when screened for unhealthy alcohol use using a systematic screening method
Quality 110: Preventive Care And Screening: Influenza Immunization: Influenza Immunization previously received during influenza season
Detail Level: Detailed
Quality 111:Pneumonia Vaccination Status For Older Adults: Patient received any pneumococcal conjugate or polysaccharide vaccine on or after their 60th birthday and before the end of the measurement period
Quality 226: Preventive Care And Screening: Tobacco Use: Screening And Cessation Intervention: Patient screened for tobacco use and is an ex/non-smoker
Quality 130: Documentation Of Current Medications In The Medical Record: Current Medications Documented

## 2023-09-11 NOTE — PROGRESS NOTES
AUDIOLOGY REPORT    SUBJECTIVE:  Zelda Osman is a 78 year old female who was seen on 9/18/23 in the Audiology Clinic at the [facility] for audiologic evaluation, referred by Tunde Painter M.D.  The patient reports gradual bilateral hearing loss.  Her left ear seems poorer than her right ear.  The patient had one episode of dizziness in 2019 which resolved in 12 hours and never returned.  The patient denies tinnitus, ear pain, drainage from the ears, aural fullness, history of ear surgery, or history of noise exposure.      OBJECTIVE:  Abuse Screening:  Do you feel unsafe at home or work/school? No  Do you feel threatened by someone? No  Does anyone try to keep you from having contact with others, or doing things outside of your home? No  Physical signs of abuse present? No     Fall Risk Screen:  1. Have you fallen two or more times in the past year? No  2. Have you fallen and had an injury in the past year? No    Timed Up and Go Score (in seconds): not tested  Is patient a fall risk? No  Referral initiated: No  Fall Risk Assessment Completed by Audiology    Otoscopic exam indicates ears are clear of cerumen bilaterally.      Pure Tone Thresholds assessed using conventional audiometry with good reliability from 250-8000 Hz bilaterally using insert earphones and circumaural headphones.      RIGHT:  Mild sensorineural hearing loss from 250-500 Hz, rising to normal, then sloping to a mild to severe sensorineural hearing loss.     LEFT: Normal/borderline normal hearing, sloping to a moderate to severe sensorineural hearing loss.    Negative Kelsey at 2000 Hz, where left ear is 20 dB poorer than right ear.   15-20 dB asymmetry 8741-8786 Hz.     Tympanogram:    RIGHT: normal eardrum mobility    LEFT:   slightly hypermobile     Reflexes (reported by stimulus ear):  RIGHT: Ipsilateral is absent at frequencies tested  RIGHT: Contralateral is absent at  frequencies tested  LEFT:   Ipsilateral is absent at frequencies tested  LEFT:   Contralateral is absent at frequencies tested      Speech Reception Threshold:    RIGHT: 20 dB HL    LEFT:   20 dB HL  Word Recognition Score:     RIGHT: 92% at 65 dB HL using NU-6 recorded word list.    LEFT:   92% at 70 dB HL using NU-6 recorded word list.      ASSESSMENT:   Sensorineural hearing loss bilaterally, asymmetric (left ear worse than right ear by 15-20 dB from 1231-9145 Hz)     Today s results were discussed with the patient in detail.     PLAN:    ENT consultation due to asymmetric hearing loss.   Recheck hearing at intervals advised by ENT to monitor asymmetry.   Consider hearing aid trial if medical clearance from ENT. Patient will look into whether she has hearing aid benefits/discount. She has given a hard copy of today's results in case she needed to obtain her hearing aids from another facility due to insurance coverage.     The patient expressed understanding and agreement with this plan.    Villa Messer, CCC-A, [facility]  Licensed Audiologist  [place] #[id number]      Enclosure: audiogram    Cc Tunde Painter M.D.

## 2023-09-18 ENCOUNTER — OFFICE VISIT (OUTPATIENT)
Dept: AUDIOLOGY | Facility: CLINIC | Age: 78
End: 2023-09-18
Attending: INTERNAL MEDICINE
Payer: COMMERCIAL

## 2023-09-18 DIAGNOSIS — H90.3 ASYMMETRICAL SENSORINEURAL HEARING LOSS: Primary | ICD-10-CM

## 2023-09-18 DIAGNOSIS — H91.93 BILATERAL HEARING LOSS: ICD-10-CM

## 2023-09-18 PROCEDURE — 92565 STENGER TEST PURE TONE: CPT | Performed by: AUDIOLOGIST-HEARING AID FITTER

## 2023-09-18 PROCEDURE — 92557 COMPREHENSIVE HEARING TEST: CPT | Performed by: AUDIOLOGIST-HEARING AID FITTER

## 2023-09-18 PROCEDURE — 92550 TYMPANOMETRY & REFLEX THRESH: CPT | Performed by: AUDIOLOGIST-HEARING AID FITTER

## 2023-09-26 ENCOUNTER — APPOINTMENT (OUTPATIENT)
Dept: URBAN - METROPOLITAN AREA CLINIC 255 | Age: 78
Setting detail: DERMATOLOGY
End: 2023-09-26

## 2023-09-26 DIAGNOSIS — L98429 CHRONIC ULCER OF OTHER SPECIFIED SITES: ICD-10-CM

## 2023-09-26 DIAGNOSIS — Z85.828 PERSONAL HISTORY OF OTHER MALIGNANT NEOPLASM OF SKIN: ICD-10-CM

## 2023-09-26 DIAGNOSIS — L98419 CHRONIC ULCER OF OTHER SPECIFIED SITES: ICD-10-CM

## 2023-09-26 PROBLEM — L98.499 NON-PRESSURE CHRONIC ULCER OF SKIN OF OTHER SITES WITH UNSPECIFIED SEVERITY: Status: ACTIVE | Noted: 2023-09-26

## 2023-09-26 PROCEDURE — OTHER ULCER EVALUATION: OTHER

## 2023-09-26 PROCEDURE — OTHER COUNSELING: OTHER

## 2023-09-26 PROCEDURE — 99213 OFFICE O/P EST LOW 20 MIN: CPT

## 2023-09-26 PROCEDURE — OTHER DIAGNOSIS COMMENT: OTHER

## 2023-09-26 PROCEDURE — OTHER MIPS QUALITY: OTHER

## 2023-09-26 ASSESSMENT — LOCATION DETAILED DESCRIPTION DERM: LOCATION DETAILED: LEFT ULNAR DORSAL HAND

## 2023-09-26 ASSESSMENT — LOCATION SIMPLE DESCRIPTION DERM: LOCATION SIMPLE: LEFT HAND

## 2023-09-26 ASSESSMENT — LOCATION ZONE DERM: LOCATION ZONE: HAND

## 2023-09-26 NOTE — PROCEDURE: ULCER EVALUATION
Instructions (Optional): Physical Examination:\\nEschar: none\\nGranulation Tissue: absent\\nRe-Epithelialization: recently re-epithelialized \\nDrainage: none\\nSurrounding Edema: absent\\nPain to palpation: 0/ 10\\nOdor: none\\nSurrounding Dermatitis: absent\\n\\nAssessment:\\nHealed\\n\\nPlan:\\nWound cleaned with H2O2\\nContinue QD wound care \\nRTC for routine skin checks (1 year with Dr. Lema)
Detail Level: Detailed

## 2023-10-04 ENCOUNTER — OFFICE VISIT (OUTPATIENT)
Dept: OTOLARYNGOLOGY | Facility: CLINIC | Age: 78
End: 2023-10-04
Payer: COMMERCIAL

## 2023-10-04 VITALS
BODY MASS INDEX: 19.99 KG/M2 | WEIGHT: 135 LBS | HEIGHT: 69 IN | HEART RATE: 82 BPM | SYSTOLIC BLOOD PRESSURE: 130 MMHG | DIASTOLIC BLOOD PRESSURE: 65 MMHG

## 2023-10-04 DIAGNOSIS — H90.3 ASYMMETRICAL SENSORINEURAL HEARING LOSS: Primary | ICD-10-CM

## 2023-10-04 PROCEDURE — 99202 OFFICE O/P NEW SF 15 MIN: CPT | Performed by: PHYSICIAN ASSISTANT

## 2023-10-04 ASSESSMENT — PAIN SCALES - GENERAL: PAINLEVEL: NO PAIN (0)

## 2023-10-04 NOTE — LETTER
10/4/2023       RE: Zelda Osman  [address] [id number]     Dear Colleague,    Thank you for referring your patient, Zelda Osman, to the [facility] E[facility]AT [facility] [place] [facility]. Please see a copy of my visit note below.      Otolaryngology Clinic  October 4, 2023    Chief Complaint:   Hearing loss       History of Present Illness:   Zelda Osman is a 78 year old female who presents to our clinic for hearing loss. She reports gradual hearing loss over the past few years. She feels her left ear is worst than right. She denies any recurrent dizziness, tinnitus, ear pain, ear drainage or prior ENT history. She is also interested in hearing aids.      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    Subclinical hypothyroidism        Past Surgical History:  Past Surgical History:   Procedure Laterality Date    BILATERAL SALPINGOOPHORECTOMY      bowel obstruction due to adhesions      HC REMOVAL OF TONSILS,<10 Y/O      Description: Tonsillectomy;  Recorded: 08/18/2011;    HYSTERECTOMY      HYSTERECTOMY      OOPHORECTOMY Bilateral     ZZC APPENDECTOMY      Description: Appendectomy;  Recorded: 09/25/2011;    ZZC REMOVAL OF OVARY(S)      Description: Oophorectomy;  Recorded: 09/25/2011;       Medications:  Current Outpatient Medications   Medication Sig Dispense Refill    atorvastatin (LIPITOR) 20 MG tablet Take 1 tablet (20 mg) by mouth daily 90 tablet  "2    diclofenac (VOLTAREN) 1 % topical gel Apply 1 g topically 4 times daily 150 g 11    UNABLE TO FIND Med Name: Compounded bioidentical hormone cream,1.2 mg estradiol, 3 mg estriol, 50 mg progesterone, 0.7 mg testosterone.Sig: Apply 1 g topically daily.  From [facility]      UNABLE TO FIND Med Name: Bone support formula (calcium citrate 1000 mg., magesium glycinate 400, vitamin D3 6000, boron 3 mg. , vitamin K1 2 mg./ tsp.) sig 1/2 sp. BID         Allergies:  Allergies   Allergen Reactions    Dust Mite Extract     Mometasone Furo-Formoterol Fum Other (See Comments)     Weight loss, jittery, fatigue        Social History:  Social History     Tobacco Use    Smoking status: Never    Smokeless tobacco: Never       ROS: 10 point ROS neg other than the symptoms noted above in the HPI.    Physical Exam:    /65 (BP Location: Left arm, Patient Position: Sitting, Cuff Size: Adult Regular)   Pulse 82   Ht 1.753 m (5' 9\")   Wt 61.2 kg (135 lb)   BMI 19.94 kg/m       Constitutional:  The patient was unaccompanied, well-groomed, and in no acute distress.     Skin: Normal:  warm and pink without rash    Neurologic: Alert and oriented x 3.  CN's III-XII within normal limits.  Voice normal.    Psychiatric: The patient's affect was calm, cooperative, and appropriate.    Communication:  Normal; communicates verbally, normal voice quality.    Respiratory: Breathing comfortably without stridor or exertion of accessory muscles.    Head/Face:  Normocephalic and atraumatic.  No lesions or scars.    Eyes: Clear sclera. Extraocular movement intact.    Ears: Pinnae and tragus non-tender.  EAC's and TM's were clear.    Nose: No anterior drainage, no external deformity.   Oral Cavity: Normal tongue, moist, adequate dentition   Neck: Supple with normal laryngeal and tracheal landmarks. Normal range of motion.      Audiogram: 9/18/2023 - data independently reviewed  Right ear: Mild sensorineural hearing loss, rising to normal, " sloping to mild to severe SNHL   Left ear: Normal/borderline normal hearing, sloping to moderate to severe SNHL   SRT right: 20 left: 20   WR right: 92% left: 92%   Acoustic Reflexes: Absent  Tympanograms: type A right, type AD left      No prior audiogram for comparison    Assessment and Plan:  1. Asymmetrical sensorineural hearing loss  Although asymmetry is mild, MRI ordered to evaluate for acoustic neuroma. Results will be communicated with patient. Patient with asymmetrical SNHL hearing loss. Reviewed audiogram with patient today. Patient is a hearing aid candidate.  Discussed with patient the benefits of hearing aids including improvement in communication and prevention of cognitive decline, social isolation, and degradation of nerve function including word recognition scores. Discussed with patient that there is a trial period prior to committing to purchase hearing aids. Patient can use this trial to determine if hearing aid benefit would be worth the cost of these devices. Patient will proceed with hearing aid consult. Patient is medically cleared for hearing aids.    - MR Brain w/o & w Contrast; Future  - Adult Audiology  Referral; Future        Patient will follow up as needed    Melissa Briggs PA-C  Otolaryngology  Head & Neck Surgery  [phone]    Review of external notes as documented elsewhere in note  20 minutes spent by me on the date of the encounter doing chart review, history and exam, documentation and further activities per the note

## 2023-10-04 NOTE — PROGRESS NOTES
Otolaryngology Clinic  October 4, 2023    Chief Complaint:   Hearing loss       History of Present Illness:   Zelda Osman is a 78 year old female who presents to our clinic for hearing loss. She reports gradual hearing loss over the past few years. She feels her left ear is worst than right. She denies any recurrent dizziness, tinnitus, ear pain, ear drainage or prior ENT history. She is also interested in hearing aids.      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    Subclinical hypothyroidism        Past Surgical History:  Past Surgical History:   Procedure Laterality Date    BILATERAL SALPINGOOPHORECTOMY      bowel obstruction due to adhesions      HC REMOVAL OF TONSILS,<13 Y/O      Description: Tonsillectomy;  Recorded: 08/18/2011;    HYSTERECTOMY      HYSTERECTOMY      OOPHORECTOMY Bilateral     ZZC APPENDECTOMY      Description: Appendectomy;  Recorded: 09/25/2011;    ZZC REMOVAL OF OVARY(S)      Description: Oophorectomy;  Recorded: 09/25/2011;       Medications:  Current Outpatient Medications   Medication Sig Dispense Refill    atorvastatin (LIPITOR) 20 MG tablet Take 1 tablet (20 mg) by mouth daily 90 tablet 2    diclofenac (VOLTAREN) 1 % topical gel Apply 1 g topically 4 times daily 150 g 11    UNABLE TO FIND Med Name: Compounded bioidentical hormone cream,1.2 mg estradiol, 3 mg estriol, 50 mg progesterone, 0.7 mg testosterone.Sig: Apply 1 g topically daily.  From [facility]      UNABLE TO FIND Med Name: Bone support formula  "(calcium citrate 1000 mg., magesium glycinate 400, vitamin D3 6000, boron 3 mg. , vitamin K1 2 mg./ tsp.) sig 1/2 sp. BID         Allergies:  Allergies   Allergen Reactions    Dust Mite Extract     Mometasone Furo-Formoterol Fum Other (See Comments)     Weight loss, jittery, fatigue        Social History:  Social History     Tobacco Use    Smoking status: Never    Smokeless tobacco: Never       ROS: 10 point ROS neg other than the symptoms noted above in the HPI.    Physical Exam:    /65 (BP Location: Left arm, Patient Position: Sitting, Cuff Size: Adult Regular)   Pulse 82   Ht 1.753 m (5' 9\")   Wt 61.2 kg (135 lb)   BMI 19.94 kg/m       Constitutional:  The patient was unaccompanied, well-groomed, and in no acute distress.     Skin: Normal:  warm and pink without rash    Neurologic: Alert and oriented x 3.  CN's III-XII within normal limits.  Voice normal.    Psychiatric: The patient's affect was calm, cooperative, and appropriate.    Communication:  Normal; communicates verbally, normal voice quality.    Respiratory: Breathing comfortably without stridor or exertion of accessory muscles.    Head/Face:  Normocephalic and atraumatic.  No lesions or scars.    Eyes: Clear sclera. Extraocular movement intact.    Ears: Pinnae and tragus non-tender.  EAC's and TM's were clear.    Nose: No anterior drainage, no external deformity.   Oral Cavity: Normal tongue, moist, adequate dentition   Neck: Supple with normal laryngeal and tracheal landmarks. Normal range of motion.      Audiogram: 9/18/2023 - data independently reviewed  Right ear: Mild sensorineural hearing loss, rising to normal, sloping to mild to severe SNHL   Left ear: Normal/borderline normal hearing, sloping to moderate to severe SNHL   SRT right: 20 left: 20   WR right: 92% left: 92%   Acoustic Reflexes: Absent  Tympanograms: type A right, type AD left      No prior audiogram for comparison    Assessment and Plan:  1. Asymmetrical sensorineural " hearing loss  Although asymmetry is mild, MRI ordered to evaluate for acoustic neuroma. Results will be communicated with patient. Patient with asymmetrical SNHL hearing loss. Reviewed audiogram with patient today. Patient is a hearing aid candidate.  Discussed with patient the benefits of hearing aids including improvement in communication and prevention of cognitive decline, social isolation, and degradation of nerve function including word recognition scores. Discussed with patient that there is a trial period prior to committing to purchase hearing aids. Patient can use this trial to determine if hearing aid benefit would be worth the cost of these devices. Patient will proceed with hearing aid consult. Patient is medically cleared for hearing aids.    - MR Brain w/o & w Contrast; Future  - Adult Audiology  Referral; Future        Patient will follow up as needed    Melissa Briggs PA-C  Otolaryngology  Head & Neck Surgery  [phone]    Review of external notes as documented elsewhere in note  20 minutes spent by me on the date of the encounter doing chart review, history and exam, documentation and further activities per the note

## 2023-10-14 ENCOUNTER — HOSPITAL ENCOUNTER (OUTPATIENT)
Dept: MRI IMAGING | Facility: CLINIC | Age: 78
Discharge: HOME | End: 2023-10-14
Attending: PHYSICIAN ASSISTANT | Admitting: PHYSICIAN ASSISTANT
Payer: COMMERCIAL

## 2023-10-14 DIAGNOSIS — H90.3 ASYMMETRICAL SENSORINEURAL HEARING LOSS: ICD-10-CM

## 2023-10-14 PROCEDURE — 255N000002 HC RX 255 OP 636: Performed by: PHYSICIAN ASSISTANT

## 2023-10-14 PROCEDURE — A9585 GADOBUTROL INJECTION: HCPCS | Performed by: PHYSICIAN ASSISTANT

## 2023-10-14 PROCEDURE — 70543 MRI ORBT/FAC/NCK W/O &W/DYE: CPT

## 2023-10-14 RX ORDER — GADOBUTROL 604.72 MG/ML
6 INJECTION INTRAVENOUS ONCE
Status: COMPLETED | OUTPATIENT
Start: 2023-10-14 | End: 2023-10-14

## 2023-10-14 RX ADMIN — GADOBUTROL 6 ML: 604.72 INJECTION INTRAVENOUS at 09:22

## 2024-02-27 ENCOUNTER — APPOINTMENT (OUTPATIENT)
Dept: URBAN - METROPOLITAN AREA CLINIC 256 | Age: 79
Setting detail: DERMATOLOGY
End: 2024-02-27

## 2024-02-27 DIAGNOSIS — Z85.828 PERSONAL HISTORY OF OTHER MALIGNANT NEOPLASM OF SKIN: ICD-10-CM

## 2024-02-27 DIAGNOSIS — D22 MELANOCYTIC NEVI: ICD-10-CM

## 2024-02-27 DIAGNOSIS — L82.1 OTHER SEBORRHEIC KERATOSIS: ICD-10-CM

## 2024-02-27 DIAGNOSIS — L82.0 INFLAMED SEBORRHEIC KERATOSIS: ICD-10-CM

## 2024-02-27 PROBLEM — D22.5 MELANOCYTIC NEVI OF TRUNK: Status: ACTIVE | Noted: 2024-02-27

## 2024-02-27 PROBLEM — D22.61 MELANOCYTIC NEVI OF RIGHT UPPER LIMB, INCLUDING SHOULDER: Status: ACTIVE | Noted: 2024-02-27

## 2024-02-27 PROBLEM — D22.62 MELANOCYTIC NEVI OF LEFT UPPER LIMB, INCLUDING SHOULDER: Status: ACTIVE | Noted: 2024-02-27

## 2024-02-27 PROCEDURE — 99213 OFFICE O/P EST LOW 20 MIN: CPT | Mod: 25

## 2024-02-27 PROCEDURE — OTHER COUNSELING: OTHER

## 2024-02-27 PROCEDURE — 17110 DESTRUCT B9 LESION 1-14: CPT

## 2024-02-27 PROCEDURE — OTHER MIPS QUALITY: OTHER

## 2024-02-27 PROCEDURE — OTHER LIQUID NITROGEN: OTHER

## 2024-02-27 ASSESSMENT — LOCATION SIMPLE DESCRIPTION DERM
LOCATION SIMPLE: INFERIOR FOREHEAD
LOCATION SIMPLE: LEFT FOREARM
LOCATION SIMPLE: ABDOMEN
LOCATION SIMPLE: LEFT UPPER BACK
LOCATION SIMPLE: RIGHT AXILLARY VAULT
LOCATION SIMPLE: UPPER BACK
LOCATION SIMPLE: RIGHT HAND
LOCATION SIMPLE: RIGHT FOREARM

## 2024-02-27 ASSESSMENT — LOCATION ZONE DERM
LOCATION ZONE: AXILLAE
LOCATION ZONE: HAND
LOCATION ZONE: FACE
LOCATION ZONE: ARM
LOCATION ZONE: TRUNK

## 2024-02-27 ASSESSMENT — LOCATION DETAILED DESCRIPTION DERM
LOCATION DETAILED: INFERIOR MID FOREHEAD
LOCATION DETAILED: RIGHT AXILLARY VAULT
LOCATION DETAILED: RIGHT RIB CAGE
LOCATION DETAILED: LEFT MEDIAL UPPER BACK
LOCATION DETAILED: RIGHT RADIAL DORSAL HAND
LOCATION DETAILED: LEFT PROXIMAL DORSAL FOREARM
LOCATION DETAILED: EPIGASTRIC SKIN
LOCATION DETAILED: RIGHT PROXIMAL DORSAL FOREARM
LOCATION DETAILED: LEFT RIB CAGE
LOCATION DETAILED: INFERIOR THORACIC SPINE

## 2024-02-27 NOTE — PROCEDURE: LIQUID NITROGEN
Show Topical Anesthesia Variable?: Yes
Spray Paint Technique: No
Application Tool (Optional): Liquid Nitrogen Sprayer
Detail Level: Detailed
Number Of Freeze-Thaw Cycles: 2 freeze-thaw cycles
Post-Care Instructions: I reviewed with the patient in detail post-care instructions. Patient is to wear sunprotection, and avoid picking at any of the treated lesions. Pt may apply Vaseline to crusted or scabbing areas.
Medical Necessity Information: It is in your best interest to select a reason for this procedure from the list below. All of these items fulfill various CMS LCD requirements except the new and changing color options.
Spray Paint Text: The liquid nitrogen was applied to the skin utilizing a spray paint frosting technique.
Duration Of Freeze Thaw-Cycle (Seconds): 5-10
Medical Necessity Clause: This procedure was medically necessary because the lesions that were treated were:
Consent: The patient's verbal consent was obtained including but not limited to risks of crusting, scabbing, blistering, scarring, darker or lighter pigmentary change, recurrence, incomplete removal and infection.

## 2024-02-27 NOTE — PROCEDURE: MOHS SURGERY
Pt calling back, states he got a call telling him he could come in to get form and that it needs his signature.    Per below, not sure if this is complete yet, please call pt when complete and can come      Mohs Case Number: [id number] Mohs Case Number: [id number]

## 2024-04-16 NOTE — TELEPHONE ENCOUNTER
Diagnosis, Referred by & from: Prolapsed Hemorrhoids   Appt date: 6/26/2024   NOTES STATUS DETAILS   OFFICE NOTE from referring provider N/A    OFFICE NOTE from other specialist [facility]:  5/1/23 - GI ONC OV with Dr. Maradiaga  6/15/22 - PCC OV with Dr. Painter  9/11/19 - GEN SURG OV with Dr. Dumont   DISCHARGE SUMMARY from [facility]:  12/20/14 - Admission with Dr. James   DISCHARGE REPORT from the ER N/A    OPERATIVE REPORT N/A    MEDICATION LIST Internal    LABS N/A    DIAGNOSTIC PROCEDURES     COLONOSCOPY (most recent all time after 5 years) [facility]:  11/14/14 - Colonoscopy   IMAGING (DISC & REPORT)      MRI [facility]:  4/22/23 - MRI Abdomen  7/9/22 - MRI Abdomen  7/25/20 - MRI Abdomen  9/8/19 - MRI Abdomen   ULTRASOUND  (ENDOANAL/ENDORECTAL) [facility]:  9/3/19 - US Abdomen

## 2024-05-19 DIAGNOSIS — E78.5 HYPERLIPIDEMIA LDL GOAL <100: ICD-10-CM

## 2024-05-24 ENCOUNTER — MYC MEDICAL ADVICE (OUTPATIENT)
Dept: INTERNAL MEDICINE | Facility: CLINIC | Age: 79
End: 2024-05-24
Payer: COMMERCIAL

## 2024-05-24 DIAGNOSIS — E78.5 HYPERLIPIDEMIA LDL GOAL <100: ICD-10-CM

## 2024-05-24 RX ORDER — ATORVASTATIN CALCIUM 20 MG/1
20 TABLET, FILM COATED ORAL DAILY
Qty: 90 TABLET | Refills: 2 | OUTPATIENT
Start: 2024-05-24

## 2024-05-24 RX ORDER — ATORVASTATIN CALCIUM 20 MG/1
20 TABLET, FILM COATED ORAL DAILY
Qty: 90 TABLET | Refills: 0 | Status: SHIPPED | OUTPATIENT
Start: 2024-05-24 | End: 2024-08-22

## 2024-05-24 NOTE — TELEPHONE ENCOUNTER
LVD:  5/10/2023  [facility]  Internal Medicine     LDL Cholesterol Calculated   Date Value Ref Range Status   05/12/2023 81 <=100 mg/dL Final   11/04/2020 76 <100 mg/dL Final     Comment:     Desirable:       <100 mg/dl       Refilled per protocol.90 day raman refill sent to the pharmacy - including instructions for patient to call the clinic and schedule an appointment.

## 2024-06-06 NOTE — PROGRESS NOTES
Colon and Rectal Surgery Consult Clinic Note    RE: Zelda Osman  : 1945  NOEL: 2024      Zelda Osman is a very pleasant 78 year old female here with concerns for prolapsing hemorrhoids. She presents to the Colon and Rectal Surgery Clinic as a self-referral for an opinion on this and a new patient consultation.     HISTORY OF PRESENT ILNESS: She has been getting stool studies for screening with her primary care provider (last time a few years ago). Last colonoscopy  was normal. She has noted some new tissue anteriorly and moistened area with excoriation with this affecting exercise like bike riding. The prolapse of tissue is a small amount and does not come out further. She has had no change in bowel movements.     PLEASE SEE NOTE BELOW FOR PHYSICAL EXAMINATION, REVIEW OF SYSTEMS, AND OTHER HISTORY.    Assessment/Plan: 78 year old female without a significant past medical history with mucosal prolapse   Hemorrhoidal suction ligation was performed today X 2, as documented. She tolerated this well and there was a nice improvement of the prolapse with the pexy of the procedure. We will check on her progress and we can consider this as a single procedure or consider repeating this if some but not significant improvement. I would not recommend a hemorrhoidectomy/resection of the prolapse.    20 minutes spent on the date of encounter performing chart review, history and exam, documentation and further activities as noted above with an additional 5 minutes for hemorrhoidal banding.     The Division of Colon and Rectal Surgery at [facility] is committed to advancing discovery and innovation in human health through research. Zelda Osman is willing to be contacted by our research team if they qualify for any future research studies:  N/A    Ambika Pritchard MD  Colon and Rectal Surgery Staff  [facility]      -------------------------------------------------------------------------------------------------------------------      Medical history:  Past Medical History:   Diagnosis Date    Hyperlipidemia LDL goal <100 12/2/2019    Kidney lesion, native, left 9/201    1.1 cm left kidney focus, seen US 9/2019 and MRI of abdomen - likely contains hemorrhagic/proteinaceous content - repeat MRI in 6 months - Jan 2020    Osteopenia of multiple sites 12/2/2019    Pancreas cyst 09/2019    Follow-up with imaging Jan 2020 - 6mm pancreatic cystic folci - no worisome findings - 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    Subclinical hypothyroidism        Surgical history:  Past Surgical History:   Procedure Laterality Date    BILATERAL SALPINGOOPHORECTOMY      bowel obstruction due to adhesions      HC REMOVAL OF TONSILS,<12 Y/O      Description: Tonsillectomy;  Recorded: 08/18/2011;    HYSTERECTOMY      HYSTERECTOMY      OOPHORECTOMY Bilateral     ZZC APPENDECTOMY      Description: Appendectomy;  Recorded: 09/25/2011;    ZZC REMOVAL OF OVARY(S)      Description: Oophorectomy;  Recorded: 09/25/2011;       Problem list:    Patient Active Problem List    Diagnosis Date Noted    Cervical radiculopathy 06/24/2022     Priority: Medium    Neck pain 06/24/2022     Priority: Medium    Hyperlipidemia LDL goal <100 12/02/2019     Priority: Medium    Osteopenia of multiple sites 12/02/2019     Priority: Medium       Medications:  Current Outpatient Medications   Medication Sig Dispense Refill    atorvastatin (LIPITOR) 20 MG tablet Take 1 tablet (20 mg) by mouth daily *PLEASE SCHEDULE APPT. FOR REFILLS AND APPOINTMENT [phone] 90 tablet 0    UNABLE TO FIND Med Name: Compounded bioidentical hormone cream,1.2 mg estradiol, 3 mg estriol, 50 mg progesterone, 0.7 mg testosterone.Sig: Apply 1 g topically daily.  From [facility]      UNABLE TO FIND Med Name: Bone support formula (calcium citrate 1000 mg., magesium  glycinate 400, vitamin D3 6000, boron 3 mg. , vitamin K1 2 mg./ tsp.) sig 1/2 sp. BID      diclofenac (VOLTAREN) 1 % topical gel Apply 1 g topically 4 times daily 150 g 11       Allergies:  Allergies   Allergen Reactions    Dust Mite Extract     Mometasone Furo-Formoterol Fum Other (See Comments)     Weight loss, jittery, fatigue       Family history:  Family History   Problem Relation Age of Onset    Cardiomyopathy Father     Prostate Cancer Father        Social history:  Social History     Socioeconomic History    Marital status:      Spouse name: Not on file    Number of children: Not on file    Years of education: Not on file    Highest education level: Not on file   Occupational History    Not on file   Tobacco Use    Smoking status: Never    Smokeless tobacco: Never   Substance and Sexual Activity    Alcohol use: Not on file    Drug use: Not on file    Sexual activity: Not on file   Other Topics Concern    Not on file   Social History Narrative    Not on file     Social Determinants of Health     Financial Resource Strain: Not on file   Food Insecurity: Not on file   Transportation Needs: Not on file   Physical Activity: Not on file   Stress: Not on file   Social Connections: Not on file   Interpersonal Safety: Not on file   Housing Stability: Not on file         Nursing Notes:   Arnaldo Marroquin, EMT  6/12/2024  5:08 PM  Signed  Chief Complaint   Patient presents with    Consult       Vitals:    06/12/24 1707   BP: (!) 145/71   BP Location: Left arm   Patient Position: Sitting   Cuff Size: Adult Regular   Pulse: 79   SpO2: 98%       There is no height or weight on file to calculate BMI.    Arnaldo Marroquin EMT-P       Physical Examination:  BP (!) 145/71 (BP Location: Left arm, Patient Position: Sitting, Cuff Size: Adult Regular)   Pulse 79   SpO2 98%   General: Pleasant, no acute distress   Perianal external examination:  Perianal skin: Intact with no excoriation or lichenification.  Lesions: No evidence  of an external lesion, nodularity, or induration in the perianal region.  Eversion of buttocks: There was not evidence of an anal fissure. Details: N/A.  Skin tags or external hemorrhoids: there was some mucosal prolapse associated with a small skin tag in the left anterior position.     Digital rectal examination: Was performed.   Sphincter tone: Good.  Palpable lesions: No.  Other: No rectocele.  Bimanual examination: was not performed.    Anoscopy: Was performed.   Hemorrhoids: No significant internal hemorrhoids.  Lesions: No.      Procedures:  A suction hemorrhoidal ligation was performed to address the mucosal prolapse.  Risks and benefits discussed and Informed consent obtained.   Anoscopy was performed and 2 separate bandings were performed in the anterior and left anterior positions with good placement and minimal discomfort. The patient tolerated this well.  Procedure was assisted by HEMALATHA Jamison.

## 2024-06-12 ENCOUNTER — OFFICE VISIT (OUTPATIENT)
Dept: SURGERY | Facility: CLINIC | Age: 79
End: 2024-06-12
Payer: COMMERCIAL

## 2024-06-12 VITALS — HEART RATE: 79 BPM | SYSTOLIC BLOOD PRESSURE: 145 MMHG | OXYGEN SATURATION: 98 % | DIASTOLIC BLOOD PRESSURE: 71 MMHG

## 2024-06-12 DIAGNOSIS — K62.3 RECTAL MUCOSA PROLAPSE: Primary | ICD-10-CM

## 2024-06-12 PROCEDURE — 46221 LIGATION OF HEMORRHOID(S): CPT | Performed by: COLON & RECTAL SURGERY

## 2024-06-12 ASSESSMENT — PAIN SCALES - GENERAL: PAINLEVEL: NO PAIN (0)

## 2024-06-12 NOTE — NURSING NOTE
Chief Complaint   Patient presents with    Consult       Vitals:    06/12/24 1707   BP: (!) 145/71   BP Location: Left arm   Patient Position: Sitting   Cuff Size: Adult Regular   Pulse: 79   SpO2: 98%       There is no height or weight on file to calculate BMI.    Arnaldo Marroquin EMT-P

## 2024-06-12 NOTE — LETTER
2024       RE: Zelda Osman  [address] [address]       Dear Colleague,    Thank you for referring your patient, Zelda Osman, to the [facility] COLON AND RE[facility]AL [facility]GERY [facility]IN[facility] [place] [facility]. Please see a copy of my visit note below.    Colon and Rectal Surgery Consult Clinic Note    RE: Zelda Osman  : 1945  NOEL: 2024      Zelda Osman is a very pleasant 78 year old female here with concerns for prolapsing hemorrhoids. She presents to the Colon and Rectal Surgery Clinic as a self-referral for an opinion on this and a new patient consultation.     HISTORY OF PRESENT ILNESS: She has been getting stool studies for screening with her primary care provider (last time a few years ago). Last colonoscopy  was normal. She has noted some new tissue anteriorly and moistened area with excoriation with this affecting exercise like bike riding. The prolapse of tissue is a small amount and does not come out further. She has had no change in bowel movements.     PLEASE SEE NOTE BELOW FOR PHYSICAL EXAMINATION, REVIEW OF SYSTEMS, AND OTHER HISTORY.    Assessment/Plan: 78 year old female without a significant past medical history with mucosal prolapse   Hemorrhoidal suction ligation was performed today X 2, as documented. She tolerated this well and there was a nice improvement of the prolapse with the pexy of the procedure. We will check on her progress and we can consider this as a single procedure or consider repeating this if some but not significant improvement. I would not recommend a hemorrhoidectomy/resection of the prolapse.    20 minutes spent on the date of encounter performing chart review, history and exam, documentation and further activities as noted above with an additional 5 minutes for hemorrhoidal banding.     The Division of Colon and Rectal Surgery at [facility]  [place] is committed to advancing discovery and innovation in human health through research. Zelda Osman is willing to be contacted by our research team if they qualify for any future research studies:  N/A    Ambika Pritchard MD  Colon and Rectal Surgery Staff  [facility]      -------------------------------------------------------------------------------------------------------------------      Medical history:  Past Medical History:   Diagnosis Date    Hyperlipidemia LDL goal <100 12/2/2019    Kidney lesion, native, left 9/201    1.1 cm left kidney focus, seen US 9/2019 and MRI of abdomen - likely contains hemorrhagic/proteinaceous content - repeat MRI in 6 months - Jan 2020    Osteopenia of multiple sites 12/2/2019    Pancreas cyst 09/2019    Follow-up with imaging Jan 2020 - 6mm pancreatic cystic folci - no worisome findings - 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    Subclinical hypothyroidism        Surgical history:  Past Surgical History:   Procedure Laterality Date    BILATERAL SALPINGOOPHORECTOMY      bowel obstruction due to adhesions      HC REMOVAL OF TONSILS,<11 Y/O      Description: Tonsillectomy;  Recorded: 08/18/2011;    HYSTERECTOMY      HYSTERECTOMY      OOPHORECTOMY Bilateral     ZZC APPENDECTOMY      Description: Appendectomy;  Recorded: 09/25/2011;    ZZC REMOVAL OF OVARY(S)      Description: Oophorectomy;  Recorded: 09/25/2011;       Problem list:    Patient Active Problem List    Diagnosis Date Noted    Cervical radiculopathy 06/24/2022     Priority: Medium    Neck pain 06/24/2022     Priority: Medium    Hyperlipidemia LDL goal <100 12/02/2019     Priority: Medium    Osteopenia of multiple sites 12/02/2019     Priority: Medium       Medications:  Current Outpatient Medications   Medication Sig Dispense Refill    atorvastatin (LIPITOR) 20 MG tablet Take 1 tablet (20 mg) by mouth daily *PLEASE SCHEDULE APPT. FOR REFILLS AND  APPOINTMENT [phone] 90 tablet 0    UNABLE TO FIND Med Name: Compounded bioidentical hormone cream,1.2 mg estradiol, 3 mg estriol, 50 mg progesterone, 0.7 mg testosterone.Sig: Apply 1 g topically daily.  From [facility]      UNABLE TO FIND Med Name: Bone support formula (calcium citrate 1000 mg., magesium glycinate 400, vitamin D3 6000, boron 3 mg. , vitamin K1 2 mg./ tsp.) sig 1/2 sp. BID      diclofenac (VOLTAREN) 1 % topical gel Apply 1 g topically 4 times daily 150 g 11       Allergies:  Allergies   Allergen Reactions    Dust Mite Extract     Mometasone Furo-Formoterol Fum Other (See Comments)     Weight loss, jittery, fatigue       Family history:  Family History   Problem Relation Age of Onset    Cardiomyopathy Father     Prostate Cancer Father        Social history:  Social History     Socioeconomic History    Marital status:      Spouse name: Not on file    Number of children: Not on file    Years of education: Not on file    Highest education level: Not on file   Occupational History    Not on file   Tobacco Use    Smoking status: Never    Smokeless tobacco: Never   Substance and Sexual Activity    Alcohol use: Not on file    Drug use: Not on file    Sexual activity: Not on file   Other Topics Concern    Not on file   Social History Narrative    Not on file     Social Determinants of Health     Financial Resource Strain: Not on file   Food Insecurity: Not on file   Transportation Needs: Not on file   Physical Activity: Not on file   Stress: Not on file   Social Connections: Not on file   Interpersonal Safety: Not on file   Housing Stability: Not on file         Nursing Notes:   Arnaldo Marroquin, EMT  6/12/2024  5:08 PM  Signed  Chief Complaint   Patient presents with    Consult       Vitals:    06/12/24 1707   BP: (!) 145/71   BP Location: Left arm   Patient Position: Sitting   Cuff Size: Adult Regular   Pulse: 79   SpO2: 98%       There is no height or weight on file to calculate  BMI.    Arnaldo Marroquin EMT-P       Physical Examination:  BP (!) 145/71 (BP Location: Left arm, Patient Position: Sitting, Cuff Size: Adult Regular)   Pulse 79   SpO2 98%   General: Pleasant, no acute distress   Perianal external examination:  Perianal skin: Intact with no excoriation or lichenification.  Lesions: No evidence of an external lesion, nodularity, or induration in the perianal region.  Eversion of buttocks: There was not evidence of an anal fissure. Details: N/A.  Skin tags or external hemorrhoids: there was some mucosal prolapse associated with a small skin tag in the left anterior position.     Digital rectal examination: Was performed.   Sphincter tone: Good.  Palpable lesions: No.  Other: No rectocele.  Bimanual examination: was not performed.    Anoscopy: Was performed.   Hemorrhoids: No significant internal hemorrhoids.  Lesions: No.      Procedures:  A suction hemorrhoidal ligation was performed to address the mucosal prolapse.  Risks and benefits discussed and Informed consent obtained.   Anoscopy was performed and 2 separate bandings were performed in the anterior and left anterior positions with good placement and minimal discomfort. The patient tolerated this well.  Procedure was assisted by HEMALATHA Jamison.        Again, thank you for allowing me to participate in the care of your patient.      Sincerely,    Amibka Pritchard MD

## 2024-06-26 ENCOUNTER — PRE VISIT (OUTPATIENT)
Dept: SURGERY | Facility: CLINIC | Age: 79
End: 2024-06-26

## 2024-07-15 ENCOUNTER — PATIENT OUTREACH (OUTPATIENT)
Dept: SURGERY | Facility: CLINIC | Age: 79
End: 2024-07-15
Payer: COMMERCIAL

## 2024-08-18 ENCOUNTER — HEALTH MAINTENANCE LETTER (OUTPATIENT)
Age: 79
End: 2024-08-18

## 2024-08-18 SDOH — HEALTH STABILITY: PHYSICAL HEALTH: ON AVERAGE, HOW MANY DAYS PER WEEK DO YOU ENGAGE IN MODERATE TO STRENUOUS EXERCISE (LIKE A BRISK WALK)?: 5 DAYS

## 2024-08-18 SDOH — HEALTH STABILITY: PHYSICAL HEALTH: ON AVERAGE, HOW MANY MINUTES DO YOU ENGAGE IN EXERCISE AT THIS LEVEL?: 150+ MIN

## 2024-08-18 ASSESSMENT — SOCIAL DETERMINANTS OF HEALTH (SDOH): HOW OFTEN DO YOU GET TOGETHER WITH FRIENDS OR RELATIVES?: THREE TIMES A WEEK

## 2024-08-19 DIAGNOSIS — E78.5 HYPERLIPIDEMIA LDL GOAL <100: ICD-10-CM

## 2024-08-22 RX ORDER — ATORVASTATIN CALCIUM 20 MG/1
20 TABLET, FILM COATED ORAL DAILY
Qty: 90 TABLET | Refills: 0 | Status: SHIPPED | OUTPATIENT
Start: 2024-08-22 | End: 2024-08-26

## 2024-08-22 NOTE — TELEPHONE ENCOUNTER
atorvastatin (LIPITOR) 20 MG tablet       Last Written Prescription Date:  5/24/24  Last Fill Quantity: 90,   # refills: 0  Last Office Visit : 5/10/23  Future Office visit:  8/26/24    Routing refill request to provider for review/approval because:  Antihyperlipidemic agents [id number]/2024 11:28 AM   Protocol Details LDL on file in the past 12 months    Recent (12 mo) or future (90 days) visit within the authorizing provider's specialty   LDL 5/12/23    Yamilet RITCHIE RN  P Central Nursing/Red Flag Triage & Med Refill Team

## 2024-08-26 ENCOUNTER — OFFICE VISIT (OUTPATIENT)
Dept: INTERNAL MEDICINE | Facility: CLINIC | Age: 79
End: 2024-08-26
Payer: COMMERCIAL

## 2024-08-26 ENCOUNTER — LAB (OUTPATIENT)
Dept: LAB | Facility: CLINIC | Age: 79
End: 2024-08-26
Payer: COMMERCIAL

## 2024-08-26 VITALS
OXYGEN SATURATION: 97 % | BODY MASS INDEX: 20.68 KG/M2 | HEART RATE: 73 BPM | DIASTOLIC BLOOD PRESSURE: 75 MMHG | WEIGHT: 139.6 LBS | SYSTOLIC BLOOD PRESSURE: 109 MMHG | HEIGHT: 69 IN

## 2024-08-26 DIAGNOSIS — E78.5 HYPERLIPIDEMIA LDL GOAL <100: ICD-10-CM

## 2024-08-26 DIAGNOSIS — R94.5 ABNORMAL RESULTS OF LIVER FUNCTION STUDIES: ICD-10-CM

## 2024-08-26 DIAGNOSIS — D49.0 IPMN (INTRADUCTAL PAPILLARY MUCINOUS NEOPLASM): ICD-10-CM

## 2024-08-26 DIAGNOSIS — E03.8 SUBCLINICAL HYPOTHYROIDISM: Primary | ICD-10-CM

## 2024-08-26 DIAGNOSIS — M85.89 OSTEOPENIA OF MULTIPLE SITES: ICD-10-CM

## 2024-08-26 DIAGNOSIS — E03.8 SUBCLINICAL HYPOTHYROIDISM: ICD-10-CM

## 2024-08-26 LAB
ALBUMIN SERPL BCG-MCNC: 3.9 G/DL (ref 3.5–5.2)
ALP SERPL-CCNC: 44 U/L (ref 40–150)
ALT SERPL W P-5'-P-CCNC: 21 U/L (ref 0–50)
AST SERPL W P-5'-P-CCNC: 21 U/L (ref 0–45)
BILIRUB DIRECT SERPL-MCNC: <0.2 MG/DL (ref 0–0.3)
BILIRUB SERPL-MCNC: 0.5 MG/DL
PROT SERPL-MCNC: 6.3 G/DL (ref 6.4–8.3)
TSH SERPL DL<=0.005 MIU/L-ACNC: 3.32 UIU/ML (ref 0.3–4.2)

## 2024-08-26 PROCEDURE — 80076 HEPATIC FUNCTION PANEL: CPT | Performed by: PATHOLOGY

## 2024-08-26 PROCEDURE — 36415 COLL VENOUS BLD VENIPUNCTURE: CPT | Performed by: PATHOLOGY

## 2024-08-26 PROCEDURE — G0439 PPPS, SUBSEQ VISIT: HCPCS | Performed by: INTERNAL MEDICINE

## 2024-08-26 PROCEDURE — 84443 ASSAY THYROID STIM HORMONE: CPT | Performed by: PATHOLOGY

## 2024-08-26 RX ORDER — ATORVASTATIN CALCIUM 20 MG/1
20 TABLET, FILM COATED ORAL DAILY
Qty: 90 TABLET | Refills: 3 | Status: SHIPPED | OUTPATIENT
Start: 2024-08-26

## 2024-08-26 NOTE — PROGRESS NOTES
"HPI  79-year-old presents today for annual visit.  She has been doing well she is doing a combination of strength training cardiovascular training.  She brings in labs that were done recently by apparent gynecology.  She had an AST of 41 on this which was 1 point above normal.  Otherwise the CMP CBC lipids testosterone progesterone levels were all within normal limits.  She has been feeling well she does not use any alcohol at all.  She has not had abnormal liver functions or AST in the past.  We discussed her DEXA and her need for follow-up MRI.  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    Subclinical hypothyroidism      Past Surgical History:   Procedure Laterality Date    BILATERAL SALPINGOOPHORECTOMY      bowel obstruction due to adhesions      HC REMOVAL OF TONSILS,<11 Y/O      Description: Tonsillectomy;  Recorded: 08/18/2011;    HYSTERECTOMY      HYSTERECTOMY      OOPHORECTOMY Bilateral     ZZC APPENDECTOMY      Description: Appendectomy;  Recorded: 09/25/2011;    ZZC REMOVAL OF OVARY(S)      Description: Oophorectomy;  Recorded: 09/25/2011;     Family History   Problem Relation Age of Onset    Cardiomyopathy Father     Prostate Cancer Father          Exam:  /75 (BP Location: Right arm, Patient Position: Sitting, Cuff Size: Adult Regular)   Pulse 73   Ht 1.74 m (5' 8.5\")   Wt 63.3 kg (139 lb 9.6 oz)   SpO2 97%   BMI 20.91 kg/m    139 lbs 9.6 oz  PHYSICAL EXAMINATION:   The patient is alert, oriented with a " clear sensorium.   Skin shows no lesions or rashes and good turgor.   Head is normocephalic and atraumatic.   Eyes show PERRLA  Ears show normal TMs bilaterally.   Mouth shows clear oral mucosa.   Neck shows no nodes, thyromegaly or bruits.   Back is nontender.   Lungs are clear to percussion and auscultation.   Heart shows normal S1 and S2 without murmur or gallop.   Abdomen is soft, nontender without masses or organomegaly.   Extremities show no edema and no evidence of active synovitis.   Neurologic examination shows cranial nerves II-XII intact. Motor shows normal strength. Reflexes are full and symmetrical.     Labs reviewed:  Results for orders placed or performed in visit on 08/26/24   TSH with free T4 reflex     Status: Normal   Result Value Ref Range    TSH 3.32 0.30 - 4.20 uIU/mL   Hepatic function panel     Status: Abnormal   Result Value Ref Range    Protein Total 6.3 (L) 6.4 - 8.3 g/dL    Albumin 3.9 3.5 - 5.2 g/dL    Bilirubin Total 0.5 <=1.2 mg/dL    Alkaline Phosphatase 44 40 - 150 U/L    AST 21 0 - 45 U/L    ALT 21 0 - 50 U/L    Bilirubin Direct <0.20 0.00 - 0.30 mg/dL       ASSESSMENT  1 cervical degenerative disc disease  2 osteoporosis on supplemental calcium and vitamin D   3 Small IPMN needs follow-up MR in 2024  4 benign gallbladder polyp  5 Subclinical hypothyroidism  7 Hypoalbuminemia  8 hyperlipidemia on atorvastatin  9 DJD thumbs    Plan  Will follow-up on her liver function studies and recheck her thyroid.  Will get an MRCP because of the IPMN and follow-up DEXA scan with her history of osteoporosis.  This note was completed using Dragon voice recognition software.      Tunde Painter MD  General Internal Medicine  [facility]  [phone]

## 2024-08-26 NOTE — PROGRESS NOTES
Aurea is a 79 year old that presents in clinic today for the following:     Chief Complaint   Patient presents with    Medicare Visit         8/18/2024   General Health   How would you rate your overall physical health? Excellent   Feel stress (tense, anxious, or unable to sleep) Not at all            8/18/2024   Nutrition   Diet: Regular (no restrictions)            8/18/2024   Exercise   Days per week of moderate/strenous exercise 5 days   Average minutes spent exercising at this level 150+ min            8/18/2024   Social Factors   Frequency of gathering with friends or relatives Three times a week   Worry food won't last until get money to buy more No   Food not last or not have enough money for food? No   Do you have housing? (Housing is defined as stable permanent housing and does not include staying ouside in a car, in a tent, in an abandoned building, in an overnight shelter, or couch-surfing.) Yes   Are you worried about losing your housing? No   Lack of transportation? No   Unable to get utilities (heat,electricity)? No            8/18/2024   Fall Risk   Fallen 2 or more times in the past year? No   Trouble with walking or balance? No             8/18/2024   Activities of Daily Living- Home Safety   Needs help with the following daily activites None of the above   Safety concerns in the home None of the above            8/18/2024   Dental   Dentist two times every year? Yes            8/18/2024   Hearing Screening   Hearing concerns? None of the above              8/18/2024   Urinary Incontinence Screening   Bothered by leaking urine in past 6 months No            8/18/2024   TB Screening   Were you born outside of the [place]? No            Today's PHQ-2 Score:       8/25/2024     9:19 AM   PHQ-2 ( 1999 [employer])   Q1: Little interest or pleasure in doing things 0   Q2: Feeling down, depressed or hopeless 0   PHQ-2 Score 0   Q1: Little interest or pleasure in doing things Not at all   Q2: Feeling down, depressed  or hopeless Not at all   PHQ-2 Score 0           8/18/2024   Substance Use   Alcohol more than 3/day or more than 7/wk Not Applicable   Do you have a current opioid prescription? No   How severe/bad is pain from 1 to 10? 0/10 (No Pain)   Do you use any other substances recreationally? No          8/26/2024     8:37 AM   Additional Questions   Roomed by YW CA     Screenings as of today     PHQ-2 Total Score (Adult) - Positive if 3 or more points; Administer   PHQ-9 if positive 0    Fallen 2 or more times in the past year?  No        VIPUL Francis at 8:38 AM on 8/26/2024

## 2024-10-04 ENCOUNTER — ANCILLARY PROCEDURE (OUTPATIENT)
Dept: MRI IMAGING | Facility: CLINIC | Age: 79
End: 2024-10-04
Attending: INTERNAL MEDICINE
Payer: COMMERCIAL

## 2024-10-04 DIAGNOSIS — D49.0 IPMN (INTRADUCTAL PAPILLARY MUCINOUS NEOPLASM): ICD-10-CM

## 2024-10-04 PROCEDURE — 74183 MRI ABD W/O CNTR FLWD CNTR: CPT | Performed by: STUDENT IN AN ORGANIZED HEALTH CARE EDUCATION/TRAINING PROGRAM

## 2024-10-04 PROCEDURE — A9585 GADOBUTROL INJECTION: HCPCS | Performed by: STUDENT IN AN ORGANIZED HEALTH CARE EDUCATION/TRAINING PROGRAM

## 2024-10-04 RX ORDER — GADOBUTROL 604.72 MG/ML
7.5 INJECTION INTRAVENOUS ONCE
Status: COMPLETED | OUTPATIENT
Start: 2024-10-04 | End: 2024-10-04

## 2024-10-04 RX ADMIN — GADOBUTROL 6.5 ML: 604.72 INJECTION INTRAVENOUS at 08:41

## 2024-10-04 NOTE — DISCHARGE INSTRUCTIONS
MRI Contrast Discharge Instructions    The IV contrast you received today will pass out of your body in your  urine. This will happen in the next 24 hours. You will not feel this process.  Your urine will not change color.    Drink at least 4 extra glasses of water or juice today (unless your doctor  has restricted your fluids). This reduces the stress on your kidneys.  You may take your regular medicines.    If you are on dialysis: It is best to have dialysis today.    If you have a reaction: Most reactions happen right away. If you have  any new symptoms after leaving the hospital (such as hives or swelling),  call your hospital at the correct number below. Or call your family doctor.  If you have breathing distress or wheezing, call 911.    Special instructions: ***    I have read and understand the above information.    Signature:______________________________________ Date:___________    Staff:__________________________________________ Date:___________     Time:__________    [place] [facility] Departments:    ___[place]: [phone]  ___[facility]: [phone]  ___[place]: [phone] ___[facility]: [phone]  ___[facility]: [phone]  ___[place] [facility]: [phone]  ___[phone]  ___[facility]: [phone]  ___[phone]

## 2024-10-10 PROCEDURE — 88305 TISSUE EXAM BY PATHOLOGIST: CPT | Mod: TC,ORL | Performed by: OPHTHALMOLOGY

## 2024-10-11 ENCOUNTER — LAB REQUISITION (OUTPATIENT)
Dept: LAB | Facility: CLINIC | Age: 79
End: 2024-10-11
Payer: COMMERCIAL

## 2024-10-11 ENCOUNTER — ANCILLARY PROCEDURE (OUTPATIENT)
Dept: BONE DENSITY | Facility: CLINIC | Age: 79
End: 2024-10-11
Attending: INTERNAL MEDICINE
Payer: COMMERCIAL

## 2024-10-11 ENCOUNTER — MYC MEDICAL ADVICE (OUTPATIENT)
Dept: INTERNAL MEDICINE | Facility: CLINIC | Age: 79
End: 2024-10-11

## 2024-10-11 DIAGNOSIS — M85.89 OSTEOPENIA OF MULTIPLE SITES: ICD-10-CM

## 2024-10-11 DIAGNOSIS — M85.80 OSTEOPENIA, UNSPECIFIED LOCATION: Primary | ICD-10-CM

## 2024-10-11 PROCEDURE — 77080 DXA BONE DENSITY AXIAL: CPT

## 2024-10-16 LAB
PATH REPORT.COMMENTS IMP SPEC: NORMAL
PATH REPORT.FINAL DX SPEC: NORMAL
PATH REPORT.GROSS SPEC: NORMAL
PATH REPORT.MICROSCOPIC SPEC OTHER STN: NORMAL
PATH REPORT.RELEVANT HX SPEC: NORMAL
PHOTO IMAGE: NORMAL

## 2024-10-16 PROCEDURE — 88305 TISSUE EXAM BY PATHOLOGIST: CPT | Mod: 26 | Performed by: PATHOLOGY

## 2024-10-16 RX ORDER — ALENDRONATE SODIUM 70 MG/1
70 TABLET ORAL
Qty: 12 TABLET | Refills: 3 | Status: SHIPPED | OUTPATIENT
Start: 2024-10-16

## 2024-10-25 ENCOUNTER — OFFICE VISIT (OUTPATIENT)
Dept: INTERNAL MEDICINE | Facility: CLINIC | Age: 79
End: 2024-10-25
Payer: COMMERCIAL

## 2024-10-25 VITALS
OXYGEN SATURATION: 97 % | WEIGHT: 140 LBS | HEIGHT: 69 IN | BODY MASS INDEX: 20.73 KG/M2 | RESPIRATION RATE: 16 BRPM | SYSTOLIC BLOOD PRESSURE: 117 MMHG | TEMPERATURE: 97.8 F | HEART RATE: 84 BPM | DIASTOLIC BLOOD PRESSURE: 72 MMHG

## 2024-10-25 DIAGNOSIS — Z01.818 PREOP GENERAL PHYSICAL EXAM: Primary | ICD-10-CM

## 2024-10-25 PROCEDURE — 99214 OFFICE O/P EST MOD 30 MIN: CPT | Performed by: INTERNAL MEDICINE

## 2024-10-25 NOTE — PROGRESS NOTES
Preoperative Evaluation  [facility] INTERNAL MEDICINE [address]  [address] [facility] [address]  Phone: [phone]  Fax: [phone]  Primary Provider: Tunde Painter MD  Pre-op Performing Provider: Andria Mendez MD  Oct 25, 2024           10/24/2024   Surgical Information   What procedure is being done? Mohs surgery on eyelid    Facility or Hospital where procedure/surgery will be performed: [facility]   Who is doing the procedure / surgery? Dr Brower and Dr Tariq    Date of surgery / procedure: Nov 11 and Nov 12    Time of surgery / procedure: 10:10    Where do you plan to recover after surgery? at home alone        Patient-reported     Fax number for surgical facility: N/A for associated, [phone]      Subjective   Aurea is a 79 year old, presenting for the following:  Pre-Op Exam  Aurea had a slowly enlarging lesion on the eyelid and it was eventually biopsied and found to have a squamous cell carcinoma skin cancer component.  She will undergo a Mohs outpatient surgery to remove it.  She feels well otherwise with no concerns, is physically fit, an avid cyclist and endorses no cardiac or infectious disease symptoms at this time.  Reviewed medications, no need to stop/hold any prescription Rx, she will avoid NSAIDs until after procedure.         10/25/2024     9:39 AM   Additional Questions   Roomed by [facility] EMT     HPI related to upcoming procedure:         10/24/2024   Pre-Op Questionnaire   Have you ever had a heart attack or stroke? No    Have you ever had surgery on your heart or blood vessels, such as a stent placement, a coronary artery bypass, or surgery on an artery in your head, neck, heart, or legs? No    Do you have chest pain with activity? No    Do you have a history of heart failure? No    Do you currently have a cold, bronchitis or symptoms of other  infection? No    Do you have a cough, shortness of breath, or wheezing? No    Do you or anyone in your family have previous history of blood clots? No    Do you or does anyone in your family have a serious bleeding problem such as prolonged bleeding following surgeries or cuts? No    Have you ever had problems with anemia or been told to take iron pills? No    Have you had any abnormal blood loss such as black, tarry or bloody stools, or abnormal vaginal bleeding? No    Have you ever had a blood transfusion? No    Are you willing to have a blood transfusion if it is medically needed before, during, or after your surgery? Yes    Have you or any of your relatives ever had problems with anesthesia? No    Do you have sleep apnea, excessive snoring or daytime drowsiness? No    Do you have any artifical heart valves or other implanted medical devices like a pacemaker, defibrillator, or continuous glucose monitor? No    Do you have artificial joints? No    Are you allergic to latex? No        Patient-reported     Health Care Directive  Patient does not have a Health Care Directive:   }      Patient Active Problem List    Diagnosis Date Noted    Cervical radiculopathy 06/24/2022     Priority: Medium    Neck pain 06/24/2022     Priority: Medium    Hyperlipidemia LDL goal <100 12/02/2019     Priority: Medium    Osteopenia of multiple sites 12/02/2019     Priority: Medium      Past Medical History:   Diagnosis Date    Arthritis 2023    Hands    Cancer (H) 1971    ovarian    Hyperlipidemia LDL goal <100 12/02/2019    Kidney lesion, native, left 9/201    1.1 cm left kidney focus, seen US 9/2019 and MRI of abdomen - likely contains hemorrhagic/proteinaceous content - repeat MRI in 6 months - Jan 2020    Osteopenia of multiple sites 12/02/2019    Pancreas cyst 09/2019    Follow-up with imaging Jan 2020 - 6mm pancreatic cystic folci - no worisome findings - 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    Subclinical  "hypothyroidism      Past Surgical History:   Procedure Laterality Date    BILATERAL SALPINGOOPHORECTOMY      bowel obstruction due to adhesions      HC REMOVAL OF TONSILS,<11 Y/O      Description: Tonsillectomy;  Recorded: 08/18/2011;    HYSTERECTOMY      HYSTERECTOMY      OOPHORECTOMY Bilateral     ZZC APPENDECTOMY      Description: Appendectomy;  Recorded: 09/25/2011;    ZZC REMOVAL OF OVARY(S)      Description: Oophorectomy;  Recorded: 09/25/2011;     Current Outpatient Medications   Medication Sig Dispense Refill    alendronate (FOSAMAX) 70 MG tablet Take 1 tablet (70 mg) by mouth every 7 days. Take with a full glass of water and do not eat or lay down for 30 minutes 12 tablet 3    atorvastatin (LIPITOR) 20 MG tablet Take 1 tablet (20 mg) by mouth daily. 90 tablet 3    UNABLE TO FIND Med Name: Compounded bioidentical hormone cream,1.2 mg estradiol, 3 mg estriol, 50 mg progesterone, 0.7 mg testosterone.Sig: Apply 1 g topically daily.  From [facility]      UNABLE TO FIND Med Name: Bone support formula (calcium citrate 1000 mg., magesium glycinate 400, vitamin D3 6000, boron 3 mg. , vitamin K1 2 mg./ tsp.) sig 1/2 sp. BID         No Known Allergies     Social History     Tobacco Use    Smoking status: Never    Smokeless tobacco: Never   Substance Use Topics    Alcohol use: Not Currently       History   Drug Use Unknown               Objective    /72 (BP Location: Right arm, Patient Position: Sitting, Cuff Size: Adult Regular)   Pulse 84   Temp 97.8  F (36.6  C) (Oral)   Resp 16   Ht 1.74 m (5' 8.5\")   Wt 63.5 kg (140 lb)   SpO2 97%   BMI 20.97 kg/m     Estimated body mass index is 20.97 kg/m  as calculated from the following:    Height as of this encounter: 1.74 m (5' 8.5\").    Weight as of this encounter: 63.5 kg (140 lb).  Physical Exam  GENERAL: alert and no distress  EYES: Eyes grossly normal to inspection, PERRL and conjunctivae and sclerae normal  HENT: ear canals and TM's normal, nose " "and mouth without ulcers or lesions  NECK: no adenopathy, no asymmetry, masses, or scars  RESP: lungs clear to auscultation - no rales, rhonchi or wheezes  CV: regular rate and rhythm, normal S1 S2, no S3 or S4, no murmur, click or rub, no peripheral edema  ABDOMEN: soft, nontender, no hepatosplenomegaly, no masses and bowel sounds normal  MS: no gross musculoskeletal defects noted, no edema  SKIN: no suspicious lesions or rashes  NEURO: Normal strength and tone, mentation intact and speech normal  PSYCH: mentation appears normal, affect normal/bright    No results for input(s): \"HGB\", \"PLT\", \"INR\", \"NA\", \"POTASSIUM\", \"CR\", \"A1C\" in the last 8760 hours.     A/P:  Low risk for perioperative events, proceed with surgery as planned    Revised Cardiac Risk Index (RCRI)  The patient has the following serious cardiovascular risks for perioperative complications:   - No serious cardiac risks = 0 points     RCRI Interpretation: 0 points: Class I (very low risk - 0.4% complication rate)    Andria Mendez MD         Signed Electronically by: Andria Mendez MD  A copy of this evaluation report is provided to the requesting physician.        "

## 2024-10-25 NOTE — PATIENT INSTRUCTIONS
Avoid aspirin or NSAIDs such as ibuprofen until after the procedure; you may take tylenol as needed for pain.  Continue your prescription medication as usual and you should do well with this type of minor surgery.  See Dr. Painter next August for your routine physical or sooner if any issues arise.  It was a pleasure to meet you in clinic!   Dr. Floyd

## 2025-01-06 ENCOUNTER — APPOINTMENT (OUTPATIENT)
Dept: URBAN - METROPOLITAN AREA CLINIC 256 | Age: 80
Setting detail: DERMATOLOGY
End: 2025-01-06

## 2025-01-06 DIAGNOSIS — L82.1 OTHER SEBORRHEIC KERATOSIS: ICD-10-CM

## 2025-01-06 DIAGNOSIS — Z85.828 PERSONAL HISTORY OF OTHER MALIGNANT NEOPLASM OF SKIN: ICD-10-CM

## 2025-01-06 DIAGNOSIS — L72.0 EPIDERMAL CYST: ICD-10-CM

## 2025-01-06 DIAGNOSIS — L57.0 ACTINIC KERATOSIS: ICD-10-CM

## 2025-01-06 DIAGNOSIS — L82.0 INFLAMED SEBORRHEIC KERATOSIS: ICD-10-CM

## 2025-01-06 DIAGNOSIS — D485 NEOPLASM OF UNCERTAIN BEHAVIOR OF SKIN: ICD-10-CM

## 2025-01-06 PROBLEM — D48.5 NEOPLASM OF UNCERTAIN BEHAVIOR OF SKIN: Status: ACTIVE | Noted: 2025-01-06

## 2025-01-06 PROCEDURE — OTHER MIPS QUALITY: OTHER

## 2025-01-06 PROCEDURE — OTHER REASSURANCE: OTHER

## 2025-01-06 PROCEDURE — 17000 DESTRUCT PREMALG LESION: CPT | Mod: 59

## 2025-01-06 PROCEDURE — OTHER MONITORING: OTHER

## 2025-01-06 PROCEDURE — OTHER COUNSELING: OTHER

## 2025-01-06 PROCEDURE — OTHER LIQUID NITROGEN: OTHER

## 2025-01-06 PROCEDURE — 99212 OFFICE O/P EST SF 10 MIN: CPT | Mod: 25

## 2025-01-06 PROCEDURE — OTHER ADDITIONAL NOTES: OTHER

## 2025-01-06 PROCEDURE — 17110 DESTRUCT B9 LESION 1-14: CPT

## 2025-01-06 PROCEDURE — OTHER PHOTO-DOCUMENTATION: OTHER

## 2025-01-06 ASSESSMENT — LOCATION ZONE DERM
LOCATION ZONE: FACE
LOCATION ZONE: EYELID
LOCATION ZONE: TRUNK

## 2025-01-06 ASSESSMENT — LOCATION DETAILED DESCRIPTION DERM
LOCATION DETAILED: LEFT SUPERIOR UPPER BACK
LOCATION DETAILED: LEFT INFERIOR UPPER BACK
LOCATION DETAILED: RIGHT CENTRAL MALAR CHEEK
LOCATION DETAILED: RIGHT CENTRAL MANDIBULAR CHEEK
LOCATION DETAILED: LEFT LATERAL INFERIOR EYELID
LOCATION DETAILED: LEFT INFERIOR LID MARGIN

## 2025-01-06 ASSESSMENT — LOCATION SIMPLE DESCRIPTION DERM
LOCATION SIMPLE: RIGHT CHEEK
LOCATION SIMPLE: LEFT UPPER BACK
LOCATION SIMPLE: LEFT INFERIOR EYELID

## 2025-01-06 NOTE — HPI: SKIN LESION
Is This A New Presentation, Or A Follow-Up?: Skin Lesion
Additional History: Invasive SCC under left eye Mohs and Plastic surgery in Nov (healing well)\\nFeels bumps on surgery site \\n\\nPosterior left shoulder - itchy spot, bleed

## 2025-01-06 NOTE — PROCEDURE: ADDITIONAL NOTES
Render Risk Assessment In Note?: no
Detail Level: Simple
Additional Notes: Mohs and plastic surgery performed in NOV 2024 w/ Dr. Lewis & Dr. Tawana Ramey.

## 2025-01-06 NOTE — PROCEDURE: REASSURANCE
Additional Notes (Optional): Reassured patient this is non-cancerous or worrisome and no treatment is necessary.
Detail Level: Detailed
Hide Additional Notes?: No

## 2025-01-06 NOTE — PROCEDURE: LIQUID NITROGEN
Render Note In Bullet Format When Appropriate: No
Show Aperture Variable?: Yes
Duration Of Freeze Thaw-Cycle (Seconds): 3
Consent: The patient's verbal consent was obtained including but not limited to risks of crusting, scabbing, blistering, scarring, darker or lighter pigmentary change, recurrence, incomplete removal and infection.
Detail Level: Simple
Number Of Freeze-Thaw Cycles: 1 freeze-thaw cycle
Post-Care Instructions: I reviewed with the patient in detail post-care instructions. Patient is to wear sunprotection, and avoid picking at any of the treated lesions. Pt may apply Vaseline to crusted or scabbing areas.
Spray Paint Text: The liquid nitrogen was applied to the skin utilizing a spray paint frosting technique.
Medical Necessity Clause: This procedure was medically necessary because the lesions that were treated were:
Medical Necessity Information: It is in your best interest to select a reason for this procedure from the list below. All of these items fulfill various CMS LCD requirements except the new and changing color options.

## 2025-03-01 ENCOUNTER — HEALTH MAINTENANCE LETTER (OUTPATIENT)
Age: 80
End: 2025-03-01

## 2025-04-08 ENCOUNTER — APPOINTMENT (OUTPATIENT)
Dept: URBAN - METROPOLITAN AREA CLINIC 256 | Age: 80
Setting detail: DERMATOLOGY
End: 2025-04-08

## 2025-04-08 VITALS — HEIGHT: 69 IN | WEIGHT: 135 LBS

## 2025-04-08 DIAGNOSIS — D485 NEOPLASM OF UNCERTAIN BEHAVIOR OF SKIN: ICD-10-CM

## 2025-04-08 DIAGNOSIS — Z85.828 PERSONAL HISTORY OF OTHER MALIGNANT NEOPLASM OF SKIN: ICD-10-CM

## 2025-04-08 PROBLEM — D48.5 NEOPLASM OF UNCERTAIN BEHAVIOR OF SKIN: Status: ACTIVE | Noted: 2025-04-08

## 2025-04-08 PROCEDURE — OTHER MONITORING: OTHER

## 2025-04-08 PROCEDURE — OTHER ADDITIONAL NOTES: OTHER

## 2025-04-08 PROCEDURE — OTHER COUNSELING: OTHER

## 2025-04-08 PROCEDURE — OTHER MIPS QUALITY: OTHER

## 2025-04-08 PROCEDURE — OTHER PHOTO-DOCUMENTATION: OTHER

## 2025-04-08 PROCEDURE — 99212 OFFICE O/P EST SF 10 MIN: CPT

## 2025-04-08 ASSESSMENT — LOCATION SIMPLE DESCRIPTION DERM
LOCATION SIMPLE: LEFT INFERIOR EYELID
LOCATION SIMPLE: RIGHT CHEEK

## 2025-04-08 ASSESSMENT — LOCATION DETAILED DESCRIPTION DERM
LOCATION DETAILED: RIGHT CENTRAL MALAR CHEEK
LOCATION DETAILED: LEFT INFERIOR LID MARGIN

## 2025-04-08 ASSESSMENT — LOCATION ZONE DERM
LOCATION ZONE: EYELID
LOCATION ZONE: FACE

## 2025-04-08 NOTE — HPI: SKIN LESION
How Severe Is Your Skin Lesion?: mild
Has Your Skin Lesion Been Treated?: not been treated
Is This A New Presentation, Or A Follow-Up?: Follow Up Skin Lesion
Additional History: Patient states that she has not seen any changes with this lesion.

## 2025-04-08 NOTE — PROCEDURE: PHOTO-DOCUMENTATION
Details (Free Text): right cheek
Detail Level: Zone
Photo Preface (Leave Blank If You Do Not Want): Photographs were obtained today

## 2025-06-13 DIAGNOSIS — M85.80 OSTEOPENIA, UNSPECIFIED LOCATION: ICD-10-CM

## 2025-06-13 NOTE — TELEPHONE ENCOUNTER
Last Written Prescription:   Disp Refills Start End SYLVIA   alendronate (FOSAMAX) 70 MG tablet 12 tablet 3 10/16/2024 -- --   Sig - Route: Take 1 tablet (70 mg) by mouth every 7 days. Take with a full glass of water and do not eat or lay down for 30 minutes - Oral     ----------------------  Last Visit Date:   10/25/2024  [facility] [place]    Future Visit Date: 0  ----------------------        Refill decision: Medication unable to be refilled by RN due to: Overdue labs/test:  creatinine clearance     Last order of DX BONE DENSITY was found on 10/11/2024 from Ancillary Procedure on 10/11/2024      Creatinine   Date Value Ref Range Status   05/12/2023 0.95 0.51 - 0.95 mg/dL Final   11/04/2020 1.02 0.52 - 1.04 mg/dL Final        Request from pharmacy:  Requested Prescriptions   Pending Prescriptions Disp Refills    alendronate (FOSAMAX) 70 MG tablet [Pharmacy Med Name: ALENDRONATE 70MG TABLETS] 12 tablet 3     Sig: TAKE 1 TABLET(70 MG) BY MOUTH EVERY 7 DAYS WITH A FULL GLASS OF WATER. DO NOT EAT OR LAY DOWN FOR 30 MINUTES       Bisphosphonates Failed - 6/13/2025 11:26 AM        Failed - Medication is active on med list and the sig matches. RN to manually verify dose and sig if red X/fail.     If the protocol passes (green check), you do not need to verify med dose and sig.    A prescription matches if they are the same clinical intention.    For Example: once daily and every morning are the same.    The protocol can not identify upper and lower case letters as matching and will fail.     For Example: Take 1 tablet (50 mg) by mouth daily     TAKE 1 TABLET (50 MG) BY MOUTH DAILY    For all fails (red x), verify dose and sig.    If the refill does match what is on file, the RN can still proceed to approve the refill request.       If they do not match, route to the appropriate provider.             Failed - Most recent Creatinine Clearance in last 12 months >35        Passed - Dexa scan  completed in the past 48-months     Please review last Dexa result.           Passed - Medication indicated for associated diagnosis     The medication is prescribed for one or more of the following conditions:     Osteoporosis   Osteitis Deformans (Paget's Disease)   Postmenopausal    Osteopenia   Arthroplasty   Crohn's Disease   Cystic Fibrosis   Fibrous Dysplasia of bone   Growth Hormone Deficiency   Hypercalcemia   Juvenile Osteoporosis   Hypogonadism          Passed - Recent (12 month) or future (90 days) visit with authorizing provider's specialty (provided they have been seen in the past 15 months)     The patient must have completed an in-person or virtual visit within the past 12 months or has a future visit scheduled within the next 90 days with the authorizing provider s specialty.  Urgent care and e-visits do not qualify as an office visit for this protocol.          Passed - Patient is age 18 or older

## 2025-06-17 RX ORDER — ALENDRONATE SODIUM 70 MG/1
TABLET ORAL
Qty: 12 TABLET | Refills: 0 | Status: SHIPPED | OUTPATIENT
Start: 2025-06-17

## 2025-06-18 ENCOUNTER — LAB (OUTPATIENT)
Dept: LAB | Facility: CLINIC | Age: 80
End: 2025-06-18
Payer: COMMERCIAL

## 2025-06-18 ENCOUNTER — RESULTS FOLLOW-UP (OUTPATIENT)
Dept: INTERNAL MEDICINE | Facility: CLINIC | Age: 80
End: 2025-06-18

## 2025-06-18 DIAGNOSIS — M85.80 OSTEOPENIA, UNSPECIFIED LOCATION: ICD-10-CM

## 2025-06-18 DIAGNOSIS — Z01.84 IMMUNITY STATUS TESTING: Primary | ICD-10-CM

## 2025-06-18 DIAGNOSIS — Z01.84 IMMUNITY STATUS TESTING: ICD-10-CM

## 2025-06-18 LAB
ANION GAP SERPL CALCULATED.3IONS-SCNC: 8 MMOL/L (ref 7–15)
BUN SERPL-MCNC: 16.9 MG/DL (ref 8–23)
CALCIUM SERPL-MCNC: 9.4 MG/DL (ref 8.8–10.4)
CHLORIDE SERPL-SCNC: 105 MMOL/L (ref 98–107)
CREAT SERPL-MCNC: 0.97 MG/DL (ref 0.51–0.95)
EGFRCR SERPLBLD CKD-EPI 2021: 59 ML/MIN/1.73M2
GLUCOSE SERPL-MCNC: 98 MG/DL (ref 70–99)
HCO3 SERPL-SCNC: 26 MMOL/L (ref 22–29)
POTASSIUM SERPL-SCNC: 4.4 MMOL/L (ref 3.4–5.3)
RUBV IGG SERPL QL IA: 0.3 INDEX
RUBV IGG SERPL QL IA: NORMAL
SODIUM SERPL-SCNC: 139 MMOL/L (ref 135–145)

## 2025-06-18 PROCEDURE — 86765 RUBEOLA ANTIBODY: CPT | Performed by: INTERNAL MEDICINE

## 2025-06-18 PROCEDURE — 36415 COLL VENOUS BLD VENIPUNCTURE: CPT | Performed by: PATHOLOGY

## 2025-06-18 PROCEDURE — 80048 BASIC METABOLIC PNL TOTAL CA: CPT | Performed by: PATHOLOGY

## 2025-06-18 PROCEDURE — 99000 SPECIMEN HANDLING OFFICE-LAB: CPT | Performed by: PATHOLOGY

## 2025-06-18 PROCEDURE — 86762 RUBELLA ANTIBODY: CPT | Performed by: INTERNAL MEDICINE

## 2025-06-19 LAB
MEV IGG SER IA-ACNC: >300 AU/ML
MEV IGG SER IA-ACNC: POSITIVE

## 2025-07-01 ENCOUNTER — RESULTS FOLLOW-UP (OUTPATIENT)
Dept: OPHTHALMOLOGY | Facility: CLINIC | Age: 80
End: 2025-07-01

## 2025-07-26 ENCOUNTER — LAB (OUTPATIENT)
Dept: LAB | Facility: CLINIC | Age: 80
End: 2025-07-26
Payer: COMMERCIAL

## 2025-07-26 ENCOUNTER — OFFICE VISIT (OUTPATIENT)
Dept: INTERNAL MEDICINE | Facility: CLINIC | Age: 80
End: 2025-07-26
Payer: COMMERCIAL

## 2025-07-26 VITALS
RESPIRATION RATE: 16 BRPM | OXYGEN SATURATION: 98 % | DIASTOLIC BLOOD PRESSURE: 66 MMHG | BODY MASS INDEX: 20.88 KG/M2 | WEIGHT: 139.4 LBS | HEART RATE: 80 BPM | SYSTOLIC BLOOD PRESSURE: 113 MMHG

## 2025-07-26 DIAGNOSIS — K21.00 GASTROESOPHAGEAL REFLUX DISEASE WITH ESOPHAGITIS WITHOUT HEMORRHAGE: ICD-10-CM

## 2025-07-26 DIAGNOSIS — K21.00 GASTROESOPHAGEAL REFLUX DISEASE WITH ESOPHAGITIS WITHOUT HEMORRHAGE: Primary | ICD-10-CM

## 2025-07-26 PROCEDURE — 99214 OFFICE O/P EST MOD 30 MIN: CPT | Performed by: INTERNAL MEDICINE

## 2025-07-26 PROCEDURE — 3074F SYST BP LT 130 MM HG: CPT | Performed by: INTERNAL MEDICINE

## 2025-07-26 PROCEDURE — 3078F DIAST BP <80 MM HG: CPT | Performed by: INTERNAL MEDICINE

## 2025-07-26 RX ORDER — PANTOPRAZOLE SODIUM 40 MG/1
40 TABLET, DELAYED RELEASE ORAL
Qty: 60 TABLET | Refills: 1 | Status: SHIPPED | OUTPATIENT
Start: 2025-07-26

## 2025-07-26 NOTE — PROGRESS NOTES
Pat is a 80 year old that presents in clinic today for the following:     Chief Complaint   Patient presents with    Follow Up    Osteoporosis    Imm/Inj     Pt wondering is she has immunity for measels, confused about results from titer lab           7/26/2025     7:59 AM   Additional Questions   Roomed by mitchell Astorga as of today     PHQ-2 Total Score (Adult) - Positive if 3 or more points; Administer   PHQ-9 if positive  0        Ed Brooks at 8:06 AM on 7/26/2025

## 2025-07-26 NOTE — PROGRESS NOTES
HPI  80-year-old send today for several issues.  She is confused about the measles titers that we had done and we explained this.  She also had questions regarding her bone density and her osteopenia.  We reviewed her serial DEXA scans which shows some improvement in the bone density with correction to osteopenia at the present time.  On the Fosamax she thinks she has had an increase in gastroesophageal reflux.  This is despite the fact that she is raise the head of her bed has not changed or increased her consumption of coffee caffeine alcohol or any other foods.  She experiences this worse at night.  There is been no associated dysphagia.  She does have a family history of esophageal cancer.  Otherwise she has been doing well she is exercising regularly doing regular weightbearing exercise and strength training and we reinforced this especially in light of her osteopenia.  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    Subclinical hypothyroidism      Past Surgical History:   Procedure Laterality Date    BILATERAL SALPINGOOPHORECTOMY      bowel obstruction due to adhesions      HC REMOVAL OF TONSILS,<13 Y/O      Description: Tonsillectomy;  Recorded: 08/18/2011;    HYSTERECTOMY      HYSTERECTOMY      OOPHORECTOMY Bilateral     OOPHOROPEXY  1971    [facility] APPENDECTOMY      Description: Appendectomy;  Recorded: 09/25/2011;    [facility]  REMOVAL OF OVARY(S)      Description: Oophorectomy;  Recorded: 09/25/2011;     Family History   Problem Relation Age of Onset    Cardiomyopathy Father     Prostate Cancer Father     Heart Disease Father         cardiomyopathy    Cancer Maternal Grandmother         esophagus         Exam:  /66 (BP Location: Right arm, Patient Position: Sitting, Cuff Size: Adult Regular)   Pulse 80   Resp 16   Wt 63.2 kg (139 lb 6.4 oz)   LMP  (LMP Unknown)   SpO2 98%   Breastfeeding No   BMI 20.88 kg/m    139 lbs 6.4 oz  The patient is alert, oriented with a clear sensorium.   Skin shows no lesions or rashes and good turgor.   Head is normocephalic and atraumatic.        ASSESSMENT  1 GERD  2 osteopenia on supplemental calcium and vitamin D   3 Small IPMN stable since 2023, MRCP due 4/2026  4 benign gallbladder polyp  5 Subclinical hypothyroidism  7 Hypoalbuminemia  8 hyperlipidemia on atorvastatin  9 DJD thumbs  10 cervical degenerative disc disease    Plan  We will have her take pantoprazole daily for 2 months and see if it does not suppress her reflux symptoms.  Will also test for H. pylori.  We discussed her bone density and her measles titers.  She will follow-up with progress report on the pantoprazole 2 to 3 months.      Over 30 minutes spent on the day of service in chart review, patient contact, record completion and review and notification of lab reports    This note was completed using Dragon voice recognition software.      Tunde Painter MD  General Internal Medicine  [facility]  [phone]

## 2025-07-30 PROCEDURE — 87338 HPYLORI STOOL AG IA: CPT | Performed by: INTERNAL MEDICINE

## 2025-07-30 PROCEDURE — 99000 SPECIMEN HANDLING OFFICE-LAB: CPT | Performed by: PATHOLOGY

## 2025-07-31 LAB — H PYLORI AG STL QL IA: NEGATIVE

## 2025-08-22 SDOH — HEALTH STABILITY: PHYSICAL HEALTH: ON AVERAGE, HOW MANY DAYS PER WEEK DO YOU ENGAGE IN MODERATE TO STRENUOUS EXERCISE (LIKE A BRISK WALK)?: 5 DAYS

## 2025-08-22 SDOH — HEALTH STABILITY: PHYSICAL HEALTH: ON AVERAGE, HOW MANY MINUTES DO YOU ENGAGE IN EXERCISE AT THIS LEVEL?: 120 MIN

## 2025-08-26 DIAGNOSIS — E78.5 HYPERLIPIDEMIA LDL GOAL <100: ICD-10-CM

## 2025-08-27 ENCOUNTER — LAB (OUTPATIENT)
Dept: LAB | Facility: CLINIC | Age: 80
End: 2025-08-27
Payer: COMMERCIAL

## 2025-08-27 ENCOUNTER — OFFICE VISIT (OUTPATIENT)
Dept: INTERNAL MEDICINE | Facility: CLINIC | Age: 80
End: 2025-08-27
Payer: COMMERCIAL

## 2025-08-27 VITALS
SYSTOLIC BLOOD PRESSURE: 122 MMHG | OXYGEN SATURATION: 98 % | DIASTOLIC BLOOD PRESSURE: 75 MMHG | BODY MASS INDEX: 21.32 KG/M2 | WEIGHT: 142.3 LBS | RESPIRATION RATE: 14 BRPM | HEART RATE: 64 BPM

## 2025-08-27 DIAGNOSIS — E03.8 SUBCLINICAL HYPOTHYROIDISM: ICD-10-CM

## 2025-08-27 DIAGNOSIS — E78.5 HYPERLIPIDEMIA LDL GOAL <100: Primary | ICD-10-CM

## 2025-08-28 RX ORDER — ATORVASTATIN CALCIUM 20 MG/1
20 TABLET, FILM COATED ORAL DAILY
Qty: 90 TABLET | Refills: 3 | Status: SHIPPED | OUTPATIENT
Start: 2025-08-28

## 2025-09-03 ENCOUNTER — APPOINTMENT (OUTPATIENT)
Dept: URBAN - METROPOLITAN AREA CLINIC 255 | Age: 80
Setting detail: DERMATOLOGY
End: 2025-09-03

## 2025-09-03 VITALS — WEIGHT: 140 LBS | HEIGHT: 69 IN

## 2025-09-03 DIAGNOSIS — L82.1 OTHER SEBORRHEIC KERATOSIS: ICD-10-CM

## 2025-09-03 DIAGNOSIS — L57.0 ACTINIC KERATOSIS: ICD-10-CM

## 2025-09-03 DIAGNOSIS — D49.2 NEOPLASM OF UNSPECIFIED BEHAVIOR OF BONE, SOFT TISSUE, AND SKIN: ICD-10-CM

## 2025-09-03 PROCEDURE — 11102 TANGNTL BX SKIN SINGLE LES: CPT

## 2025-09-03 PROCEDURE — OTHER BIOPSY BY SHAVE METHOD: OTHER

## 2025-09-03 PROCEDURE — 99212 OFFICE O/P EST SF 10 MIN: CPT | Mod: 25

## 2025-09-03 PROCEDURE — OTHER PATIENT SPECIFIC COUNSELING: OTHER

## 2025-09-03 PROCEDURE — OTHER COUNSELING: OTHER

## 2025-09-03 PROCEDURE — OTHER MIPS QUALITY: OTHER

## 2025-09-03 PROCEDURE — 11103 TANGNTL BX SKIN EA SEP/ADDL: CPT

## 2025-09-03 PROCEDURE — OTHER LIQUID NITROGEN: OTHER

## 2025-09-03 PROCEDURE — 17000 DESTRUCT PREMALG LESION: CPT | Mod: 59

## 2025-09-03 ASSESSMENT — LOCATION SIMPLE DESCRIPTION DERM: LOCATION SIMPLE: LEFT CHEEK

## 2025-09-03 ASSESSMENT — LOCATION DETAILED DESCRIPTION DERM
LOCATION DETAILED: LEFT LATERAL MALAR CHEEK
LOCATION DETAILED: LEFT INFERIOR PREAURICULAR CHEEK
LOCATION DETAILED: LEFT LATERAL MANDIBULAR CHEEK

## 2025-09-03 ASSESSMENT — LOCATION ZONE DERM: LOCATION ZONE: FACE
